# Patient Record
Sex: MALE | Race: WHITE | NOT HISPANIC OR LATINO | Employment: FULL TIME | URBAN - METROPOLITAN AREA
[De-identification: names, ages, dates, MRNs, and addresses within clinical notes are randomized per-mention and may not be internally consistent; named-entity substitution may affect disease eponyms.]

---

## 2017-02-17 ENCOUNTER — TELEPHONE (OUTPATIENT)
Dept: UROLOGY | Facility: CLINIC | Age: 47
End: 2017-02-17

## 2017-02-17 NOTE — TELEPHONE ENCOUNTER
Patient was calling to check the dosage of his testosterone injections.  When asked to review the bottle for dosage, he says there are no dosage numbers on the bottle or the box.    Advised that Dr. Davey prescribed 200mg/ml every 4 weeks.

## 2017-02-17 NOTE — TELEPHONE ENCOUNTER
----- Message from Verna Trevino sent at 2/17/2017  1:46 PM CST -----  Patient requesting to speak with nurse for milligram of injection/please call back at [phone] to advise.

## 2020-09-28 RX ORDER — AMLODIPINE BESYLATE 5 MG/1
5 TABLET ORAL DAILY
Qty: 90 TABLET | Refills: 0 | Status: SHIPPED | OUTPATIENT
Start: 2020-09-28 | End: 2022-01-24

## 2020-09-28 RX ORDER — LOSARTAN POTASSIUM 25 MG/1
25 TABLET ORAL DAILY
Qty: 90 TABLET | Refills: 0 | Status: SHIPPED | OUTPATIENT
Start: 2020-09-28 | End: 2021-10-08 | Stop reason: SDUPTHER

## 2020-09-28 RX ORDER — METOPROLOL SUCCINATE 25 MG/1
25 TABLET, EXTENDED RELEASE ORAL DAILY
Qty: 90 TABLET | Refills: 0 | Status: SHIPPED | OUTPATIENT
Start: 2020-09-28 | End: 2021-10-08 | Stop reason: SDUPTHER

## 2020-09-28 NOTE — TELEPHONE ENCOUNTER
----- Message from Florin Salazar sent at 9/28/2020  1:22 PM CDT -----  Regarding: refill  Pt said [facility] sent a request for refills you denied because he needed to make an appt   Made an appt in November in [place]     [phone]  [phone]

## 2021-05-06 ENCOUNTER — PATIENT MESSAGE (OUTPATIENT)
Dept: RESEARCH | Facility: HOSPITAL | Age: 51
End: 2021-05-06

## 2021-10-07 ENCOUNTER — TELEPHONE (OUTPATIENT)
Dept: CARDIOLOGY | Facility: CLINIC | Age: 51
End: 2021-10-07

## 2021-10-08 RX ORDER — LOSARTAN POTASSIUM 25 MG/1
25 TABLET ORAL DAILY
Qty: 90 TABLET | Refills: 3 | Status: SHIPPED | OUTPATIENT
Start: 2021-10-08 | End: 2022-01-24

## 2021-10-08 RX ORDER — METOPROLOL SUCCINATE 25 MG/1
25 TABLET, EXTENDED RELEASE ORAL DAILY
Qty: 90 TABLET | Refills: 3 | Status: SHIPPED | OUTPATIENT
Start: 2021-10-08 | End: 2022-02-07 | Stop reason: SDUPTHER

## 2021-11-24 ENCOUNTER — OFFICE VISIT (OUTPATIENT)
Dept: ORTHOPEDICS | Facility: CLINIC | Age: 51
End: 2021-11-24
Payer: COMMERCIAL

## 2021-11-24 VITALS — HEIGHT: 70 IN | WEIGHT: 231 LBS | BODY MASS INDEX: 33.07 KG/M2

## 2021-11-24 DIAGNOSIS — S46.211A RUPTURE OF RIGHT BICEPS TENDON, INITIAL ENCOUNTER: Primary | ICD-10-CM

## 2021-11-24 PROCEDURE — 4010F ACE/ARB THERAPY RXD/TAKEN: CPT | Mod: S$GLB,,, | Performed by: PHYSICIAN ASSISTANT

## 2021-11-24 PROCEDURE — 99203 PR OFFICE/OUTPT VISIT, NEW, LEVL III, 30-44 MIN: ICD-10-PCS | Mod: S$GLB,,, | Performed by: PHYSICIAN ASSISTANT

## 2021-11-24 PROCEDURE — 4010F PR ACE/ARB THEARPY RXD/TAKEN: ICD-10-PCS | Mod: S$GLB,,, | Performed by: PHYSICIAN ASSISTANT

## 2021-11-24 PROCEDURE — 99203 OFFICE O/P NEW LOW 30 MIN: CPT | Mod: S$GLB,,, | Performed by: PHYSICIAN ASSISTANT

## 2021-11-24 RX ORDER — CELECOXIB 100 MG/1
CAPSULE ORAL
COMMUNITY
Start: 2021-10-30 | End: 2022-07-05

## 2021-11-24 RX ORDER — CYCLOBENZAPRINE HCL 10 MG
TABLET ORAL
COMMUNITY
Start: 2021-11-16 | End: 2022-07-05

## 2021-12-10 ENCOUNTER — OFFICE VISIT (OUTPATIENT)
Dept: ORTHOPEDICS | Facility: CLINIC | Age: 51
End: 2021-12-10
Payer: COMMERCIAL

## 2021-12-10 VITALS — WEIGHT: 231 LBS | BODY MASS INDEX: 33.07 KG/M2 | HEIGHT: 70 IN

## 2021-12-10 DIAGNOSIS — M53.3 COCCYDYNIA: Primary | ICD-10-CM

## 2021-12-10 PROCEDURE — 20552 PR INJECT TRIGGER POINT, 1 OR 2: ICD-10-PCS | Mod: S$GLB,,, | Performed by: PHYSICIAN ASSISTANT

## 2021-12-10 PROCEDURE — 99213 PR OFFICE/OUTPT VISIT, EST, LEVL III, 20-29 MIN: ICD-10-PCS | Mod: 25,S$GLB,, | Performed by: PHYSICIAN ASSISTANT

## 2021-12-10 PROCEDURE — 99213 OFFICE O/P EST LOW 20 MIN: CPT | Mod: 25,S$GLB,, | Performed by: PHYSICIAN ASSISTANT

## 2021-12-10 PROCEDURE — 20552 NJX 1/MLT TRIGGER POINT 1/2: CPT | Mod: S$GLB,,, | Performed by: PHYSICIAN ASSISTANT

## 2021-12-10 PROCEDURE — 4010F PR ACE/ARB THEARPY RXD/TAKEN: ICD-10-PCS | Mod: S$GLB,,, | Performed by: PHYSICIAN ASSISTANT

## 2021-12-10 PROCEDURE — 4010F ACE/ARB THERAPY RXD/TAKEN: CPT | Mod: S$GLB,,, | Performed by: PHYSICIAN ASSISTANT

## 2021-12-10 RX ORDER — TRIAMCINOLONE ACETONIDE 40 MG/ML
40 INJECTION, SUSPENSION INTRA-ARTICULAR; INTRAMUSCULAR
Status: DISCONTINUED | OUTPATIENT
Start: 2021-12-10 | End: 2021-12-10 | Stop reason: HOSPADM

## 2021-12-10 RX ADMIN — TRIAMCINOLONE ACETONIDE 40 MG: 40 INJECTION, SUSPENSION INTRA-ARTICULAR; INTRAMUSCULAR at 09:12

## 2022-01-03 ENCOUNTER — OFFICE VISIT (OUTPATIENT)
Dept: UROLOGY | Facility: CLINIC | Age: 52
End: 2022-01-03
Payer: COMMERCIAL

## 2022-01-03 VITALS
BODY MASS INDEX: 33.08 KG/M2 | SYSTOLIC BLOOD PRESSURE: 147 MMHG | HEIGHT: 70 IN | HEART RATE: 72 BPM | WEIGHT: 231.06 LBS | DIASTOLIC BLOOD PRESSURE: 86 MMHG

## 2022-01-03 DIAGNOSIS — Z80.42 FAMILY HISTORY OF PROSTATE CANCER IN FATHER: Primary | ICD-10-CM

## 2022-01-03 DIAGNOSIS — R53.83 FATIGUE, UNSPECIFIED TYPE: ICD-10-CM

## 2022-01-03 LAB
BILIRUB SERPL-MCNC: NORMAL MG/DL
BLOOD URINE, POC: NORMAL
CLARITY, POC UA: CLEAR
COLOR, POC UA: YELLOW
GLUCOSE UR QL STRIP: NORMAL
KETONES UR QL STRIP: NORMAL
LEUKOCYTE ESTERASE URINE, POC: NORMAL
NITRITE, POC UA: NORMAL
PH, POC UA: 5
PROTEIN, POC: NORMAL
SPECIFIC GRAVITY, POC UA: 1.01
UROBILINOGEN, POC UA: NORMAL

## 2022-01-03 PROCEDURE — 1159F MED LIST DOCD IN RCRD: CPT | Mod: CPTII,S$GLB,, | Performed by: UROLOGY

## 2022-01-03 PROCEDURE — 3008F PR BODY MASS INDEX (BMI) DOCUMENTED: ICD-10-PCS | Mod: CPTII,S$GLB,, | Performed by: UROLOGY

## 2022-01-03 PROCEDURE — 3077F SYST BP >= 140 MM HG: CPT | Mod: CPTII,S$GLB,, | Performed by: UROLOGY

## 2022-01-03 PROCEDURE — 3008F BODY MASS INDEX DOCD: CPT | Mod: CPTII,S$GLB,, | Performed by: UROLOGY

## 2022-01-03 PROCEDURE — 3079F PR MOST RECENT DIASTOLIC BLOOD PRESSURE 80-89 MM HG: ICD-10-PCS | Mod: CPTII,S$GLB,, | Performed by: UROLOGY

## 2022-01-03 PROCEDURE — 99999 PR PBB SHADOW E&M-EST. PATIENT-LVL IV: ICD-10-PCS | Mod: PBBFAC,,, | Performed by: UROLOGY

## 2022-01-03 PROCEDURE — 1160F PR REVIEW ALL MEDS BY PRESCRIBER/CLIN PHARMACIST DOCUMENTED: ICD-10-PCS | Mod: CPTII,S$GLB,, | Performed by: UROLOGY

## 2022-01-03 PROCEDURE — 1160F RVW MEDS BY RX/DR IN RCRD: CPT | Mod: CPTII,S$GLB,, | Performed by: UROLOGY

## 2022-01-03 PROCEDURE — 81002 URINALYSIS NONAUTO W/O SCOPE: CPT | Mod: S$GLB,,, | Performed by: UROLOGY

## 2022-01-03 PROCEDURE — 3077F PR MOST RECENT SYSTOLIC BLOOD PRESSURE >= 140 MM HG: ICD-10-PCS | Mod: CPTII,S$GLB,, | Performed by: UROLOGY

## 2022-01-03 PROCEDURE — 3079F DIAST BP 80-89 MM HG: CPT | Mod: CPTII,S$GLB,, | Performed by: UROLOGY

## 2022-01-03 PROCEDURE — 1159F PR MEDICATION LIST DOCUMENTED IN MEDICAL RECORD: ICD-10-PCS | Mod: CPTII,S$GLB,, | Performed by: UROLOGY

## 2022-01-03 PROCEDURE — 81002 POCT URINE DIPSTICK WITHOUT MICROSCOPE: ICD-10-PCS | Mod: S$GLB,,, | Performed by: UROLOGY

## 2022-01-03 PROCEDURE — 99999 PR PBB SHADOW E&M-EST. PATIENT-LVL IV: CPT | Mod: PBBFAC,,, | Performed by: UROLOGY

## 2022-01-03 PROCEDURE — 99204 PR OFFICE/OUTPT VISIT, NEW, LEVL IV, 45-59 MIN: ICD-10-PCS | Mod: S$GLB,,, | Performed by: UROLOGY

## 2022-01-03 PROCEDURE — 99204 OFFICE O/P NEW MOD 45 MIN: CPT | Mod: S$GLB,,, | Performed by: UROLOGY

## 2022-01-03 NOTE — PATIENT INSTRUCTIONS
Nancy Retana is a 51 y.o. male with     Previously on T and fatigue improved no other sx.  Father had prostate cancer in his 50s/60s. Very low psa previously. Firm right prostate without nodules. Repeat screening psa.     If psa higher (doubled) may need further testing (mri?)  If psa about there same, continue to monitor with repeat REJI in 6months or sooner if we decide to replenish T.     If T<300, repeat  If T <300 x 2 then return for T discussion (treatment) and monitor psa and REJI closely     1. Family history of prostate cancer in father    2. Fatigue, unspecified type          Plan:     Testosterone before 9am Wednesday +  psa screening and cbc with auto dif. If h/h lower refer back to cards to eval fatigue. (no colonoscopy yet).     [place] live in [place]    At minimum f/u in 6 months for repeat REJI.

## 2022-01-03 NOTE — PROGRESS NOTES
[facility] - [place]  Staff: MD Petar  PCP: Primary Doctor No  Date of Service: 01/03/2022      Subjective:        HPI: Nancy Retana is a 51 y.o. male     They live in [place]    He saw me in 2016 for low T and family hx of prostate cancer.     His father had prostate cancer in 50s or early 60s. He was treated with surgery. No recurrence. Pt had a psa in 2010 0.35 and 2016 0.61. none checked since and no problems urinating.    We also had a checked a T. T was 340 and he did T injections for about a year. He felt like it helped with fatigue and feels like he wants to try again. He never had a f/u after and hasn't been seen since. He can't remember stopping.    The patient does have good libido   The patient does not have erectile dysfunction  The patient does not have depression  The patient does have fatigue  The patient does not have concentration issues  The patient does not have difficulty gaining or maintaining muscle mass    The patient gets 5-6 sleep a night.   The patient does not workout regularly  The patient does have obstructive sleep apnea? Snores, stops breathing.     Sees  for ablation history, htn       Testosterone labs:  11/4/16: T:340, H/H: 14.9/43.3, PSA: 0.61      Current REVIEW OF SYSTEMS:  Neg except for as stated above    Urine history:   1/3/22 neg    PSA History:  malignancies: Yes - prostate cancer dx age late 50s treaed with prostatectomy  .   11/4/16 0.61, REJI: 30g  3/29/10 0.35      REVIEW OF SYSTEMS:  Negative except for as stated above    Past Medical History:   Diagnosis Date    Hypertension        Past Surgical History:   Procedure Laterality Date    CARDIAC ELECTROPHYSIOLOGY STUDY AND ABLATION      TONSILLECTOMY, ADENOIDECTOMY      VASECTOMY             Objective:     Vitals:    01/03/22 1026   BP: (!) 147/86   Pulse: 72       Focused  exam  Inspection of anus normal  No scrotal rashes, cysts or lesions  Epididymis  normal in size, no tenderness  Testes normal and size, equal size bilaterally, no masses  Urethral meatus normal without discharge  Penis is circumcised  REJI: 25g gland without masses but firm on right, tenderness. SV not palpable. Normal sphincter tone.   No bilateral inguinal hernias noted       Assessment:     Nancy Retana is a 51 y.o. male with     Previously on T and fatigue improved no other sx.  Father had prostate cancer in his 50s/60s. Very low psa previously. Firm right prostate without nodules. Repeat screening psa.     If psa higher (doubled) may need further testing (mri?)  If psa about there same, continue to monitor with repeat REJI in 6months or sooner if we decide to replenish T.     If T<300, repeat  If T <300 x 2 then return for T discussion (treatment) and monitor psa and REJI closely     1. Family history of prostate cancer in father    2. Fatigue, unspecified type          Plan:     Testosterone before 9am Wednesday +  psa screening and cbc with auto dif. If h/h lower refer back to cards to eval fatigue. (no colonoscopy yet).     [place] in [place]    At minimum f/u in 6 months for repeat REJI. -scheduled today     Opal Davey MD

## 2022-01-05 ENCOUNTER — TELEPHONE (OUTPATIENT)
Dept: UROLOGY | Facility: CLINIC | Age: 52
End: 2022-01-05
Payer: COMMERCIAL

## 2022-01-05 NOTE — TELEPHONE ENCOUNTER
----- Message from Cierra Machado sent at 1/5/2022  8:34 AM CST -----  Regarding: lab order request  Contact: [facility]  Caller: [facility]  Phone: [phone]  Fax# [phone]  Nature of call: caller requesting lab orders; testosterone and PSA.   Patient is there waiting.  Please call upon request.  Thank you!

## 2022-01-06 ENCOUNTER — TELEPHONE (OUTPATIENT)
Dept: UROLOGY | Facility: CLINIC | Age: 52
End: 2022-01-06
Payer: COMMERCIAL

## 2022-01-06 NOTE — TELEPHONE ENCOUNTER
Returned call and spoke with patient, inquired where did he have his lab work done because nothing is resulted in the chart. He states at [facility], looked thru faxed records, informed nothing received, will contact them to get faxed to office for MD to review, patient verbally understood.

## 2022-01-06 NOTE — TELEPHONE ENCOUNTER
----- Message from Hollie Lee sent at 1/6/2022  1:01 PM CST -----  Regarding: Test Results  Contact: Patient/[phone]  Type:  Test Results    Who Called:  Patient/[phone] (home) [phone] (work)    Name of Test (Lab/Mammo/Etc):  Lab  Date of Test:  1/5/22  Ordering Provider:  same  Where the test was performed:  [facility]    Additional Information:  States the results were faxed to your office yesterday. Please call to advise.

## 2022-01-06 NOTE — TELEPHONE ENCOUNTER
----- Message from Jeannette Oconnell sent at 1/6/2022 10:44 AM CST -----  Contact: self  Type:  Test Results    Who Called:  patient  Name of Test (Lab/Mammo/Etc):  labs  Date of Test:  1/5  Ordering Provider:  Dr Davey  Where the test was performed:  [facility]  Best Call Back Number:  [phone] (work)  Additional Information:  thanks

## 2022-01-11 NOTE — TELEPHONE ENCOUNTER
12/30/21  psa 0.6, h/h 16.0/48.8    I did not see a testosterone lab  Please call and see if they stacey a testosterone lab  Verbally obtain the testosterone value and document

## 2022-01-12 NOTE — TELEPHONE ENCOUNTER
Spoke with patient and informed him of results and recommendations to repeat his T. Patient verbalized understanding.

## 2022-01-12 NOTE — TELEPHONE ENCOUNTER
Let him know his T level was 174. This is less than 300. Therefore it needs to be repeated again and needs to be done before 9am.   If its <300 twice he can return to discuss T treatmen t.     12/30/21           psa 0.6, h/h 16.0/48.8, T 174

## 2022-01-24 ENCOUNTER — OFFICE VISIT (OUTPATIENT)
Dept: CARDIOLOGY | Facility: CLINIC | Age: 52
End: 2022-01-24
Payer: COMMERCIAL

## 2022-01-24 VITALS
DIASTOLIC BLOOD PRESSURE: 100 MMHG | SYSTOLIC BLOOD PRESSURE: 130 MMHG | BODY MASS INDEX: 34.36 KG/M2 | WEIGHT: 240 LBS | HEIGHT: 70 IN | HEART RATE: 80 BPM

## 2022-01-24 DIAGNOSIS — I10 ESSENTIAL HYPERTENSION: ICD-10-CM

## 2022-01-24 DIAGNOSIS — E78.5 DYSLIPIDEMIA: ICD-10-CM

## 2022-01-24 DIAGNOSIS — I10 HYPERTENSION, UNSPECIFIED TYPE: Primary | ICD-10-CM

## 2022-01-24 DIAGNOSIS — E66.01 MORBID OBESITY: ICD-10-CM

## 2022-01-24 PROCEDURE — 1160F PR REVIEW ALL MEDS BY PRESCRIBER/CLIN PHARMACIST DOCUMENTED: ICD-10-PCS | Mod: CPTII,S$GLB,, | Performed by: INTERNAL MEDICINE

## 2022-01-24 PROCEDURE — 93000 EKG 12-LEAD: ICD-10-PCS | Mod: S$GLB,,, | Performed by: INTERNAL MEDICINE

## 2022-01-24 PROCEDURE — 93000 ELECTROCARDIOGRAM COMPLETE: CPT | Mod: S$GLB,,, | Performed by: INTERNAL MEDICINE

## 2022-01-24 PROCEDURE — 3080F DIAST BP >= 90 MM HG: CPT | Mod: CPTII,S$GLB,, | Performed by: INTERNAL MEDICINE

## 2022-01-24 PROCEDURE — 3080F PR MOST RECENT DIASTOLIC BLOOD PRESSURE >= 90 MM HG: ICD-10-PCS | Mod: CPTII,S$GLB,, | Performed by: INTERNAL MEDICINE

## 2022-01-24 PROCEDURE — 1160F RVW MEDS BY RX/DR IN RCRD: CPT | Mod: CPTII,S$GLB,, | Performed by: INTERNAL MEDICINE

## 2022-01-24 PROCEDURE — 3008F PR BODY MASS INDEX (BMI) DOCUMENTED: ICD-10-PCS | Mod: CPTII,S$GLB,, | Performed by: INTERNAL MEDICINE

## 2022-01-24 PROCEDURE — 1159F MED LIST DOCD IN RCRD: CPT | Mod: CPTII,S$GLB,, | Performed by: INTERNAL MEDICINE

## 2022-01-24 PROCEDURE — 3075F PR MOST RECENT SYSTOLIC BLOOD PRESS GE 130-139MM HG: ICD-10-PCS | Mod: CPTII,S$GLB,, | Performed by: INTERNAL MEDICINE

## 2022-01-24 PROCEDURE — 99214 OFFICE O/P EST MOD 30 MIN: CPT | Mod: S$GLB,,, | Performed by: INTERNAL MEDICINE

## 2022-01-24 PROCEDURE — 3008F BODY MASS INDEX DOCD: CPT | Mod: CPTII,S$GLB,, | Performed by: INTERNAL MEDICINE

## 2022-01-24 PROCEDURE — 1159F PR MEDICATION LIST DOCUMENTED IN MEDICAL RECORD: ICD-10-PCS | Mod: CPTII,S$GLB,, | Performed by: INTERNAL MEDICINE

## 2022-01-24 PROCEDURE — 99214 PR OFFICE/OUTPT VISIT, EST, LEVL IV, 30-39 MIN: ICD-10-PCS | Mod: S$GLB,,, | Performed by: INTERNAL MEDICINE

## 2022-01-24 PROCEDURE — 3075F SYST BP GE 130 - 139MM HG: CPT | Mod: CPTII,S$GLB,, | Performed by: INTERNAL MEDICINE

## 2022-01-24 RX ORDER — OLMESARTAN MEDOXOMIL AND HYDROCHLOROTHIAZIDE 20/12.5 20; 12.5 MG/1; MG/1
1 TABLET ORAL DAILY
Qty: 90 TABLET | Refills: 3 | Status: ON HOLD | OUTPATIENT
Start: 2022-01-24 | End: 2023-01-25

## 2022-01-24 RX ORDER — POTASSIUM CHLORIDE 750 MG/1
20 TABLET, EXTENDED RELEASE ORAL 2 TIMES DAILY
Qty: 360 TABLET | Refills: 3 | Status: SHIPPED | OUTPATIENT
Start: 2022-01-24 | End: 2022-09-09 | Stop reason: SDUPTHER

## 2022-01-24 NOTE — ASSESSMENT & PLAN NOTE
His BMI is 30 in 4.44 are.  He is going to get back on restricted diet and also initiate an exercise program are.  Previously he has lost over 60 lb with careful dietary intake and encouraged him to get back on it.  His EKG today shows sinus rhythm incomplete right bundle branch and moderate criteria for LVH is present

## 2022-01-24 NOTE — ASSESSMENT & PLAN NOTE
His blood pressure remains elevated.  His home recording of also shown elevated blood pressures recommend to do the following  1. Stop losartan  2. Start him on Benicar head CT at 20/12.5 mg daily and add 10 mg of potassium to his regimen.  3. Continue Toprol-XL 25 mg daily in the morning

## 2022-01-24 NOTE — ASSESSMENT & PLAN NOTE
Out encourage him to recheck his labs including liver profile and lipid levels.  Maintain low-fat low-cholesterol diet

## 2022-01-24 NOTE — PROGRESS NOTES
Subjective:    Patient ID:  Nancy Retana is a 51 y.o. male patient here for evaluation No chief complaint on file.      History of Present Illness:     Patient is a 51-year-old gentleman is here for follow-up evaluation arm he had prior ablated therapy done.  He did not feel good and ended up going to the emergency room and early part of January apparently preliminary evaluation was negative and other than high blood pressure he was subsequently discharged home.  Are since his be home has been doing fairly well denies having chest discomfort no jaw pain no shoulder pain.  No nausea vomiting no blood in the stools or black stools  Blood pressure at home recordings have also been elevated.      Review of patient's allergies indicates:  No Known Allergies    Past Medical History:   Diagnosis Date    Hypertension      Past Surgical History:   Procedure Laterality Date    CARDIAC ELECTROPHYSIOLOGY STUDY AND ABLATION      TONSILLECTOMY, ADENOIDECTOMY      VASECTOMY       Social History     Tobacco Use    Smoking status: Never Smoker    Smokeless tobacco: Never Used        Review of Systems:    As noted in HPI in addition      REVIEW OF SYSTEMS  CARDIOVASCULAR: No recent chest pain, palpitations, arm, neck, or jaw pain  RESPIRATORY: No recent fever, cough chills, SOB or congestion  : No blood in the urine  GI: No Nausea, vomiting, constipation, diarrhea, blood, or reflux.  MUSCULOSKELETAL: No myalgias  NEURO: No lightheadedness or dizziness  EYES: No Double vision, blurry, vision or headache              Objective        Vitals:    01/24/22 1605   BP: (!) 130/100   Pulse: 80       LIPIDS - LAST 2   Lab Results   Component Value Date    CHOL 225 (H) 11/17/2016    HDL 46 11/17/2016    LDLCALC 135.4 11/17/2016    TRIG 218 (H) 11/17/2016    CHOLHDL 20.4 11/17/2016       CBC - LAST 2  Lab Results   Component Value Date    WBC 12.50 11/17/2016    RBC 4.61 11/17/2016    HGB 14.1 11/17/2016    HCT 41.7 11/17/2016     MCV 91 11/17/2016    MCH 30.7 11/17/2016    MCHC 33.9 11/17/2016    RDW 12.2 11/17/2016     11/17/2016    MPV 7.5 (L) 11/17/2016       CHEMISTRY & LIVER FUNCTION - LAST 2  Lab Results   Component Value Date     11/17/2016    K 4.2 11/17/2016     11/17/2016    CO2 25 11/17/2016    ANIONGAP 10 11/17/2016    BUN 17 11/17/2016    CREATININE 1.0 11/17/2016     (H) 11/17/2016    CALCIUM 9.5 11/17/2016    ALBUMIN 3.7 11/17/2016    ALBUMIN 4.7 11/04/2016    PROT 7.8 11/17/2016    ALKPHOS 67 11/17/2016    ALT 30 11/17/2016    AST 17 11/17/2016    BILITOT 0.6 11/17/2016        CARDIAC PROFILE - LAST 2  No results found for: BNP, CPK, CPKMB, LDH, TROPONINI     COAGULATION - LAST 2  No results found for: LABPT, INR, APTT    ENDOCRINE & PSA - LAST 2  Lab Results   Component Value Date    PSA 0.35 03/29/2010        ECHOCARDIOGRAM RESULTS  No results found for this or any previous visit.      CURRENT/PREVIOUS VISIT EKG  No results found for this or any previous visit.  No valid procedures specified.   No results found for this or any previous visit.    No valid procedures specified.    PHYSICAL EXAM  CONSTITUTIONAL: Well built, well nourished in no apparent distress  NECK: no carotid bruit, no JVD  LUNGS: CTA  CHEST WALL: no tenderness  HEART: regular rate and rhythm, S1, S2 normal, no murmur, click, rub or gallop   ABDOMEN: soft, non-tender; bowel sounds normal; no masses,  no organomegaly  EXTREMITIES: Extremities normal, no edema, no calf tenderness noted  NEURO: AAO X 3    I HAVE REVIEWED :    The vital signs, nurses notes, and all the pertinent radiology and labs.        Current Outpatient Medications   Medication Instructions    celecoxib (CELEBREX) 100 MG capsule No dose, route, or frequency recorded.    cyclobenzaprine (FLEXERIL) 10 MG tablet No dose, route, or frequency recorded.    DOCOSAHEXANOIC ACID/EPA (FISH OIL ORAL) Oral    FOLIC ACID ORAL Oral    GREEN TEA LEAF EXTRACT (GREEN TEA  ORAL) Oral    KRILL OIL ORAL Oral    metoprolol succinate (TOPROL-XL) 25 mg, Oral, Daily    multivitamin capsule 1 capsule, Oral, Daily    NAPROXEN SODIUM (ALEVE ORAL) Oral    olmesartan-hydrochlorothiazide (BENICAR HCT) 20-12.5 mg per tablet 1 tablet, Oral, Daily    potassium chloride SA (K-DUR,KLOR-CON) 10 MEQ tablet 20 mEq, Oral, 2 times daily    RED YEAST RICE ORAL Oral    TURMERIC, BULK, MISC Misc.(Non-Drug; Combo Route)    UBIDECARENONE (CO Q-10 ORAL) Oral      01/24/2022 EKG shows sinus rhythm incomplete right bundle branch morphology voltage criteria for LVH is present with no acute ST changes.    Assessment & Plan     Essential hypertension  His blood pressure remains elevated.  His home recording of also shown elevated blood pressures recommend to do the following  1. Stop losartan  2. Start him on Benicar head CT at 20/12.5 mg daily and add 10 mg of potassium to his regimen.  3. Continue Toprol-XL 25 mg daily in the morning  4. Continue on metoprolol succinate 25 mg daily     Dyslipidemia  Out encourage him to recheck his labs including liver profile and lipid levels.  Maintain low-fat low-cholesterol diet    Morbid obesity  His BMI is 30 in 4.44 are.  He is going to get back on restricted diet and also initiate an exercise program are.  Previously he has lost over 60 lb with careful dietary intake and encouraged him to get back on it.  His EKG today shows sinus rhythm incomplete right bundle branch and moderate criteria for LVH is present          Follow up in about 3 months (around 4/24/2022).

## 2022-02-02 ENCOUNTER — PATIENT MESSAGE (OUTPATIENT)
Dept: GASTROENTEROLOGY | Facility: CLINIC | Age: 52
End: 2022-02-02
Payer: COMMERCIAL

## 2022-02-07 RX ORDER — METOPROLOL SUCCINATE 25 MG/1
25 TABLET, EXTENDED RELEASE ORAL DAILY
Qty: 90 TABLET | Refills: 3 | Status: SHIPPED | OUTPATIENT
Start: 2022-02-07

## 2022-02-07 NOTE — TELEPHONE ENCOUNTER
----- Message from Reggie Giraldo sent at 2/7/2022  8:29 AM CST -----  Contact: Patient  The pt called and would like to know which medication he is supposed to stop taking     Please call him back at [phone]

## 2022-04-04 ENCOUNTER — PATIENT MESSAGE (OUTPATIENT)
Dept: GASTROENTEROLOGY | Facility: CLINIC | Age: 52
End: 2022-04-04
Payer: COMMERCIAL

## 2022-07-05 ENCOUNTER — TELEPHONE (OUTPATIENT)
Dept: FAMILY MEDICINE | Facility: CLINIC | Age: 52
End: 2022-07-05
Payer: COMMERCIAL

## 2022-07-05 ENCOUNTER — PATIENT MESSAGE (OUTPATIENT)
Dept: UROLOGY | Facility: CLINIC | Age: 52
End: 2022-07-05

## 2022-07-05 ENCOUNTER — OFFICE VISIT (OUTPATIENT)
Dept: UROLOGY | Facility: CLINIC | Age: 52
End: 2022-07-05
Payer: COMMERCIAL

## 2022-07-05 VITALS
RESPIRATION RATE: 18 BRPM | WEIGHT: 245 LBS | SYSTOLIC BLOOD PRESSURE: 126 MMHG | DIASTOLIC BLOOD PRESSURE: 83 MMHG | BODY MASS INDEX: 35.07 KG/M2 | HEART RATE: 80 BPM | HEIGHT: 70 IN

## 2022-07-05 DIAGNOSIS — R53.83 FATIGUE, UNSPECIFIED TYPE: Primary | ICD-10-CM

## 2022-07-05 DIAGNOSIS — E29.1 HYPOGONADISM IN MALE: ICD-10-CM

## 2022-07-05 PROCEDURE — 3074F PR MOST RECENT SYSTOLIC BLOOD PRESSURE < 130 MM HG: ICD-10-PCS | Mod: CPTII,S$GLB,, | Performed by: UROLOGY

## 2022-07-05 PROCEDURE — 1159F MED LIST DOCD IN RCRD: CPT | Mod: CPTII,S$GLB,, | Performed by: UROLOGY

## 2022-07-05 PROCEDURE — 3008F PR BODY MASS INDEX (BMI) DOCUMENTED: ICD-10-PCS | Mod: CPTII,S$GLB,, | Performed by: UROLOGY

## 2022-07-05 PROCEDURE — 3079F DIAST BP 80-89 MM HG: CPT | Mod: CPTII,S$GLB,, | Performed by: UROLOGY

## 2022-07-05 PROCEDURE — 3008F BODY MASS INDEX DOCD: CPT | Mod: CPTII,S$GLB,, | Performed by: UROLOGY

## 2022-07-05 PROCEDURE — 99999 PR PBB SHADOW E&M-EST. PATIENT-LVL IV: CPT | Mod: PBBFAC,,, | Performed by: UROLOGY

## 2022-07-05 PROCEDURE — 3074F SYST BP LT 130 MM HG: CPT | Mod: CPTII,S$GLB,, | Performed by: UROLOGY

## 2022-07-05 PROCEDURE — 99999 PR PBB SHADOW E&M-EST. PATIENT-LVL IV: ICD-10-PCS | Mod: PBBFAC,,, | Performed by: UROLOGY

## 2022-07-05 PROCEDURE — 99215 OFFICE O/P EST HI 40 MIN: CPT | Mod: S$GLB,,, | Performed by: UROLOGY

## 2022-07-05 PROCEDURE — 1159F PR MEDICATION LIST DOCUMENTED IN MEDICAL RECORD: ICD-10-PCS | Mod: CPTII,S$GLB,, | Performed by: UROLOGY

## 2022-07-05 PROCEDURE — 1160F PR REVIEW ALL MEDS BY PRESCRIBER/CLIN PHARMACIST DOCUMENTED: ICD-10-PCS | Mod: CPTII,S$GLB,, | Performed by: UROLOGY

## 2022-07-05 PROCEDURE — 4010F PR ACE/ARB THEARPY RXD/TAKEN: ICD-10-PCS | Mod: CPTII,S$GLB,, | Performed by: UROLOGY

## 2022-07-05 PROCEDURE — 3079F PR MOST RECENT DIASTOLIC BLOOD PRESSURE 80-89 MM HG: ICD-10-PCS | Mod: CPTII,S$GLB,, | Performed by: UROLOGY

## 2022-07-05 PROCEDURE — 1160F RVW MEDS BY RX/DR IN RCRD: CPT | Mod: CPTII,S$GLB,, | Performed by: UROLOGY

## 2022-07-05 PROCEDURE — 4010F ACE/ARB THERAPY RXD/TAKEN: CPT | Mod: CPTII,S$GLB,, | Performed by: UROLOGY

## 2022-07-05 PROCEDURE — 99215 PR OFFICE/OUTPT VISIT, EST, LEVL V, 40-54 MIN: ICD-10-PCS | Mod: S$GLB,,, | Performed by: UROLOGY

## 2022-07-05 RX ORDER — LOSARTAN POTASSIUM 50 MG/1
50 TABLET ORAL
COMMUNITY
End: 2023-01-24 | Stop reason: SDUPTHER

## 2022-07-05 RX ORDER — METOPROLOL TARTRATE 25 MG/1
25 TABLET, FILM COATED ORAL
COMMUNITY
End: 2023-01-24

## 2022-07-05 NOTE — PATIENT INSTRUCTIONS
Nancy Retana is a 51 y.o. male     With fatigue and hypogonadism workup underway. T in January was 174. Need to repeat and if low restart     Will stay with me bc of h/o of father with prostate cancer. Will need to monitor psa and REJI     1. Fatigue, unspecified type    2. Hypogonadism in male        Plan:     Cbc, T and psa soon before 9am soon    If T <300 before 9am will start T treatment. Doesn't have to see me to start.  If labs done and no f/u or instructions given, write me on epic.     Discussed cream vs injections . Proceed with injections  Probably T200 q2 weeks (could always do 100q 1week)- 1st injection here to learn. 10mL vials (200mg per 1mL, 9 doses in a vial).     Cbc T and psa in a month (5-7 days after administration)  Cbc T and psa in 4 month (1-2 days prior to shot) - REJI then.     As long as psa doesn't rise or h/h doesn't raise and CHRISTOPHER? Type sx don't worsen - then can change to aveed in 4 months.     Sent message to pcp to establish care. Sees julia lui for years for cards.  Pt will make f/u with GI for screening colonoscopy    Will stay with me bc of h/o of father with prostate cancer. Will need to monitor psa and REJI

## 2022-07-05 NOTE — TELEPHONE ENCOUNTER
----- Message from Opal Davey MD sent at 7/5/2022  2:42 PM CDT -----  This pt wasnts to establish pcp. Doesn't matter who , just whoever has first avail

## 2022-07-05 NOTE — PROGRESS NOTES
[facility] - [place]  Staff: MD Petar  PCP: Primary Doctor No  Date of Service: 07/05/2022      Subjective:        HPI: Nancy Retana is a 51 y.o. male     He saw me in 2016 for low T and family hx of prostate cancer.   · His father had prostate cancer in 50s or early 60s. He was treated with surgery. No recurrence. Pt had a psa in 2010 0.35 and 2016 0.61. none checked since and no problems urinating.  · We also had a checked a T. T was 340 and he did T injections for about a year. He felt like it helped with fatigue and feels like he wants to try again. He never had a f/u after and hasn't been seen since. He can't remember stopping.  · Only significant sx was fatigue.     Interval history since last visit with me:  Pt had a T, psa and h/h checked in January.   T was 174, psa was 0.6 and h/h 16.0/48.8.    HPI/CC today 7/5/22:   Fatigue still his main issue  Had T done but can't find labs.   No Ed. No problems urinating. Hasn't had a colonsocopy. Moved to [place]    Testosterone labs:  1/5/22 T 174,   h/h 16.0/48.8  12/30/21 psa 0.6  11/4/16: T:340, H/H: 14.9/43.3, PSA: 0.61    Urine history:   1/3/22 neg    PSA History: father prostate cancer dx age late 50s treaed with prostatectomy    1/3/22  REJI: 25g gland without masses but firm on right,   12/30/21 0.6   11/4/16 0.61, REJI: 30g  3/29/10 0.35      REVIEW OF SYSTEMS:  Negative except for as stated above      Objective:     Vitals:    07/05/22 1403   BP: 126/83   Pulse: 80   Resp: 18     gu exam as above     Assessment:     Nancy Retana is a 51 y.o. male     With fatigue and hypogonadism workup underway. T in January was 174. Need to repeat and if low restart     Will stay with me bc of h/o of father with prostate cancer. Will need to monitor psa and REJI     1. Fatigue, unspecified type    2. Hypogonadism in male        Plan:     Cbc, T and psa soon before 9am soon    If T <300 before 9am will start T treatment.  Doesn't have to see me to start.  If labs done and no f/u or instructions given, write me on epic.     Discussed cream vs injections . Proceed with injections  Probably T200 q2 weeks (could always do 100q 1week)- 1st injection here to learn. 10mL vials (200mg per 1mL, 9 doses in a vial).     Cbc T and psa in a month (5-7 days after administration)  Cbc T and psa in 4 month (1-2 days prior to shot) - REJI then.     As long as psa doesn't rise or h/h doesn't raise and CHRISTOPHER? Type sx don't worsen - then can change to aveed in 4 months.     Sent message to pcp to establish care. Sees julia already for years for cards.  Pt will make f/u with GI for screening colonoscopy    Will stay with me bc of h/o of father with prostate cancer. Will need to monitor psa and REJI     Opal Davey MD

## 2022-07-05 NOTE — TELEPHONE ENCOUNTER
New patient establish care appointment scheduled for the date of 8-16-22. Patient agreed to appointment date, time, and location. Appointment scheduled using auto search to populate appropriate date, time, and length of appointment.

## 2022-07-08 ENCOUNTER — OFFICE VISIT (OUTPATIENT)
Dept: ORTHOPEDICS | Facility: CLINIC | Age: 52
End: 2022-07-08
Payer: COMMERCIAL

## 2022-07-08 VITALS — WEIGHT: 225 LBS | HEIGHT: 70 IN | BODY MASS INDEX: 32.21 KG/M2

## 2022-07-08 DIAGNOSIS — M17.11 ARTHRITIS OF KNEE, RIGHT: ICD-10-CM

## 2022-07-08 DIAGNOSIS — M17.11 PATELLOFEMORAL ARTHRITIS OF RIGHT KNEE: Primary | ICD-10-CM

## 2022-07-08 PROCEDURE — 4010F ACE/ARB THERAPY RXD/TAKEN: CPT | Mod: CPTII,S$GLB,, | Performed by: PHYSICIAN ASSISTANT

## 2022-07-08 PROCEDURE — 20610 DRAIN/INJ JOINT/BURSA W/O US: CPT | Mod: RT,S$GLB,, | Performed by: PHYSICIAN ASSISTANT

## 2022-07-08 PROCEDURE — 1159F MED LIST DOCD IN RCRD: CPT | Mod: CPTII,S$GLB,, | Performed by: PHYSICIAN ASSISTANT

## 2022-07-08 PROCEDURE — 1160F RVW MEDS BY RX/DR IN RCRD: CPT | Mod: CPTII,S$GLB,, | Performed by: PHYSICIAN ASSISTANT

## 2022-07-08 PROCEDURE — 99213 OFFICE O/P EST LOW 20 MIN: CPT | Mod: 25,S$GLB,, | Performed by: PHYSICIAN ASSISTANT

## 2022-07-08 PROCEDURE — 4010F PR ACE/ARB THEARPY RXD/TAKEN: ICD-10-PCS | Mod: CPTII,S$GLB,, | Performed by: PHYSICIAN ASSISTANT

## 2022-07-08 PROCEDURE — 3008F PR BODY MASS INDEX (BMI) DOCUMENTED: ICD-10-PCS | Mod: CPTII,S$GLB,, | Performed by: PHYSICIAN ASSISTANT

## 2022-07-08 PROCEDURE — 20610 LARGE JOINT ASPIRATION/INJECTION: R KNEE: ICD-10-PCS | Mod: RT,S$GLB,, | Performed by: PHYSICIAN ASSISTANT

## 2022-07-08 PROCEDURE — 1160F PR REVIEW ALL MEDS BY PRESCRIBER/CLIN PHARMACIST DOCUMENTED: ICD-10-PCS | Mod: CPTII,S$GLB,, | Performed by: PHYSICIAN ASSISTANT

## 2022-07-08 PROCEDURE — 3008F BODY MASS INDEX DOCD: CPT | Mod: CPTII,S$GLB,, | Performed by: PHYSICIAN ASSISTANT

## 2022-07-08 PROCEDURE — 1159F PR MEDICATION LIST DOCUMENTED IN MEDICAL RECORD: ICD-10-PCS | Mod: CPTII,S$GLB,, | Performed by: PHYSICIAN ASSISTANT

## 2022-07-08 PROCEDURE — 99213 PR OFFICE/OUTPT VISIT, EST, LEVL III, 20-29 MIN: ICD-10-PCS | Mod: 25,S$GLB,, | Performed by: PHYSICIAN ASSISTANT

## 2022-07-08 RX ORDER — TRIAMCINOLONE ACETONIDE 40 MG/ML
40 INJECTION, SUSPENSION INTRA-ARTICULAR; INTRAMUSCULAR
Status: DISCONTINUED | OUTPATIENT
Start: 2022-07-08 | End: 2022-07-08 | Stop reason: HOSPADM

## 2022-07-08 RX ADMIN — TRIAMCINOLONE ACETONIDE 40 MG: 40 INJECTION, SUSPENSION INTRA-ARTICULAR; INTRAMUSCULAR at 10:07

## 2022-07-08 NOTE — PROGRESS NOTES
[facility]  [address]  [place] [address]  Phone: [phone]   [phone]    Patient's PCP: Primary Doctor No  Referring Provider: No ref. provider found    Subjective:      Chief Complaint:   Chief Complaint   Patient presents with    Right Knee - Pain     Right knee pain x 2 weeks, will radiate to lower leg at times and will radiate upwards at times, but the primarily at the knee, no swelling, no giving way, no popping       Past Medical History:   Diagnosis Date    Hypertension        Past Surgical History:   Procedure Laterality Date    CARDIAC ELECTROPHYSIOLOGY STUDY AND ABLATION      TONSILLECTOMY, ADENOIDECTOMY      VASECTOMY         Current Outpatient Medications   Medication Sig    aspirin 81 mg Cap Take 81 mg by mouth.    DOCOSAHEXANOIC ACID/EPA (FISH OIL ORAL) Take by mouth.    FOLIC ACID ORAL Take by mouth.    GREEN TEA LEAF EXTRACT (GREEN TEA ORAL) Take by mouth.    KRILL OIL ORAL Take by mouth.    losartan (COZAAR) 50 MG tablet Take 50 mg by mouth.    metoprolol succinate (TOPROL-XL) 25 MG 24 hr tablet Take 1 tablet (25 mg total) by mouth once daily.    metoprolol tartrate (LOPRESSOR) 25 MG tablet Take 25 mg by mouth.    multivitamin capsule Take 1 capsule by mouth once daily.    NAPROXEN SODIUM (ALEVE ORAL) Take by mouth.    olmesartan-hydrochlorothiazide (BENICAR HCT) 20-12.5 mg per tablet Take 1 tablet by mouth once daily.    potassium chloride SA (K-DUR,KLOR-CON) 10 MEQ tablet Take 2 tablets (20 mEq total) by mouth 2 (two) times daily.    RED YEAST RICE ORAL Take by mouth.    TURMERIC, BULK, MISC by Misc.(Non-Drug; Combo Route) route.    UBIDECARENONE (CO Q-10 ORAL) Take by mouth.     No current facility-administered medications for this visit.       Review of patient's allergies indicates:  No Known Allergies    History reviewed. No pertinent family history.    Social History     Socioeconomic History    Marital status:    Tobacco Use     Smoking status: Never Smoker    Smokeless tobacco: Never Used       History of present illness:  Mr. Cruz comes in today with a chief complaint of right knee pain that has been going on for approximately 2 weeks.  He denies any injury or trauma to the knee.  He reports he does not notice any swelling.  He has not tried any formal treatment for this.  He localizes the majority of his discomfort towards the medial and posterior aspect of the knee.    Review of Systems:    Constitutional: Negative for chills, fever and weight loss.   HENT: Negative for congestion.    Eyes: Negative for discharge and redness.   Respiratory: Negative for cough and shortness of breath.    Cardiovascular: Negative for chest pain.   Gastrointestinal: Negative for nausea and vomiting.   Musculoskeletal: See HPI.   Skin: Negative for rash.   Neurological: Negative for headaches.   Endo/Heme/Allergies: Does not bruise/bleed easily.   Psychiatric/Behavioral: The patient is not nervous/anxious.    All other systems reviewed and are negative.       Objective:      Physical Examination:    Vital Signs:  There were no vitals filed for this visit.    Body mass index is 32.28 kg/m².    This a well-developed, well nourished patient in no acute distress.  They are alert and oriented and cooperative to examination.     Right knee exam:  Skin to his right knee is clean dry and intact.  There is no erythema or ecchymosis.  There are no signs or symptoms of infection.  Patient is neurovascularly intact throughout the right lower extremity.  Right knee active range of motion is approximately 0-110 degrees.  Right knee is stable to varus and valgus stresses while held in extension.  Right calf is soft and nontender.  He has a negative straight leg raise on the right.  He has well-preserved internal/external rotation of the right hip without pain.  He does have some mild diffuse tenderness medially.  He does have a sense of fullness in the popliteal  fossa but no palpable Smith cyst.  He does have a mild effusion.  He is tender to palpation about the medial and lateral patella facets of his right knee.  He can weightbear as tolerated right lower extremity and has a nonantalgic gait.    Pertinent New Results:        XRAY Report / Interpretation:   Three views were taken of the right knee today:  AP, lateral, and sunrise views.  They reveal no acute fractures or dislocations.  Visualized soft tissues are unremarkable.  Patient does have patellofemoral arthrosis with an inferior patellar osteophyte seen on lateral view.      Assessment:       1. Patellofemoral arthritis of right knee    2. Arthritis of knee, right      Plan:     Patellofemoral arthritis of right knee  -     X-Ray Knee 3 View Right  -     Large Joint Aspiration/Injection: R knee    Arthritis of knee, right  -     Cancel: X-Ray Lumbar Spine Ap And Lateral  -     Cancel: X-Ray Pelvis Routine AP  -     X-Ray Knee 3 View Right  -     Large Joint Aspiration/Injection: R knee        Follow up in about 3 months (around 10/8/2022) for 3 mth f/up Right knee inj 7/8/22.    I injected the patient's right knee today via an anterior lateral approach with 40 mg of Kenalog and lidocaine.  He tolerated this well.  We will check him back in 3 months to see how he is doing with this injection.  I advised if he is doing quite well he can simply cancel that appointment.  Conversely, total he was not much improved within 2 weeks, he should follow up with us sooner for further evaluation and treatment options.  Consideration of an MRI may be warranted to evaluate his patellofemoral joint to see if he could benefit from an arthroscopic debridement.        Cesar Capps, MARIA LUZS, PA-C    This note was created using [employer] voice recognition software that occasionally misinterprets words or phrases.

## 2022-07-08 NOTE — PROCEDURES
Large Joint Aspiration/Injection: R knee    Date/Time: 7/8/2022 10:15 AM  Performed by: Cesar Capps PA-C  Authorized by: Cesar Capps PA-C     Consent Done?:  Yes (Verbal)  Indications:  Pain and arthritis  Site marked: the procedure site was marked    Timeout: prior to procedure the correct patient, procedure, and site was verified    Prep: patient was prepped and draped in usual sterile fashion      Local anesthesia used?: Yes    Local anesthetic:  Lidocaine 1% without epinephrine    Details:  Needle Size:  25 G  Ultrasonic Guidance for needle placement?: No    Location:  Knee  Site:  R knee  Medications:  40 mg triamcinolone acetonide 40 mg/mL  Patient tolerance:  Patient tolerated the procedure well with no immediate complications

## 2022-07-15 ENCOUNTER — LAB VISIT (OUTPATIENT)
Dept: LAB | Facility: HOSPITAL | Age: 52
End: 2022-07-15
Attending: UROLOGY
Payer: COMMERCIAL

## 2022-07-15 DIAGNOSIS — E29.1 HYPOGONADISM IN MALE: ICD-10-CM

## 2022-07-15 DIAGNOSIS — R53.83 FATIGUE, UNSPECIFIED TYPE: ICD-10-CM

## 2022-07-15 LAB
BASOPHILS # BLD AUTO: 0.08 K/UL (ref 0–0.2)
BASOPHILS NFR BLD: 0.6 % (ref 0–1.9)
COMPLEXED PSA SERPL-MCNC: 0.54 NG/ML (ref 0–4)
DIFFERENTIAL METHOD: ABNORMAL
EOSINOPHIL # BLD AUTO: 0.1 K/UL (ref 0–0.5)
EOSINOPHIL NFR BLD: 1 % (ref 0–8)
ERYTHROCYTE [DISTWIDTH] IN BLOOD BY AUTOMATED COUNT: 12.6 % (ref 11.5–14.5)
HCT VFR BLD AUTO: 45.2 % (ref 40–54)
HGB BLD-MCNC: 15.6 G/DL (ref 14–18)
IMM GRANULOCYTES # BLD AUTO: 0.09 K/UL (ref 0–0.04)
IMM GRANULOCYTES NFR BLD AUTO: 0.6 % (ref 0–0.5)
LYMPHOCYTES # BLD AUTO: 4.4 K/UL (ref 1–4.8)
LYMPHOCYTES NFR BLD: 31.5 % (ref 18–48)
MCH RBC QN AUTO: 32 PG (ref 27–31)
MCHC RBC AUTO-ENTMCNC: 34.5 G/DL (ref 32–36)
MCV RBC AUTO: 93 FL (ref 82–98)
MONOCYTES # BLD AUTO: 1 K/UL (ref 0.3–1)
MONOCYTES NFR BLD: 7.3 % (ref 4–15)
NEUTROPHILS # BLD AUTO: 8.2 K/UL (ref 1.8–7.7)
NEUTROPHILS NFR BLD: 59 % (ref 38–73)
NRBC BLD-RTO: 0 /100 WBC
PLATELET # BLD AUTO: 220 K/UL (ref 150–450)
PMV BLD AUTO: 9.6 FL (ref 9.2–12.9)
RBC # BLD AUTO: 4.88 M/UL (ref 4.6–6.2)
TESTOST SERPL-MCNC: 169 NG/DL (ref 304–1227)
WBC # BLD AUTO: 13.89 K/UL (ref 3.9–12.7)

## 2022-07-15 PROCEDURE — 36415 COLL VENOUS BLD VENIPUNCTURE: CPT | Mod: PO | Performed by: UROLOGY

## 2022-07-15 PROCEDURE — 84153 ASSAY OF PSA TOTAL: CPT | Performed by: UROLOGY

## 2022-07-15 PROCEDURE — 85025 COMPLETE CBC W/AUTO DIFF WBC: CPT | Performed by: UROLOGY

## 2022-07-15 PROCEDURE — 84403 ASSAY OF TOTAL TESTOSTERONE: CPT | Performed by: UROLOGY

## 2022-07-19 ENCOUNTER — TELEPHONE (OUTPATIENT)
Dept: UROLOGY | Facility: CLINIC | Age: 52
End: 2022-07-19
Payer: COMMERCIAL

## 2022-07-19 ENCOUNTER — PATIENT MESSAGE (OUTPATIENT)
Dept: UROLOGY | Facility: CLINIC | Age: 52
End: 2022-07-19
Payer: COMMERCIAL

## 2022-07-19 DIAGNOSIS — E29.1 HYPOGONADISM IN MALE: Primary | ICD-10-CM

## 2022-07-19 RX ORDER — TESTOSTERONE ENANTHATE 200 MG/ML
200 VIAL (ML) INTRAMUSCULAR
Qty: 10 ML | Refills: 2 | Status: SHIPPED | OUTPATIENT
Start: 2022-07-19 | End: 2022-12-23

## 2022-07-19 NOTE — TELEPHONE ENCOUNTER
Discussed cream vs injections . Proceed with injections  T200 q2 weeks (could always do 100q 1week)- 1st injection here to learn. 10mL vials (200mg per 1mL, 9 doses in a vial).      Cbc T and psa in a month (5-7 days after administration)  Cbc T and psa in 4 month (1-2 days prior to shot) - REJI then.      As long as psa doesn't rise or h/h doesn't raise and CHRISTOPHER? Type sx don't worsen - then can change to aveed in 4 months.      Sent message to pcp to establish care. Sees julia already for years for cards.  Pt will make f/u with GI for screening colonoscopy     Will stay with me bc of h/o of father with prostate cancer. Will need to monitor psa and REJI

## 2022-07-27 ENCOUNTER — TELEPHONE (OUTPATIENT)
Dept: UROLOGY | Facility: CLINIC | Age: 52
End: 2022-07-27
Payer: COMMERCIAL

## 2022-07-27 NOTE — TELEPHONE ENCOUNTER
----- Message from Cristino Dennis sent at 7/27/2022  2:08 PM CDT -----  Contact: Patient  Type:  Patient Call          Who Called:PT         Does the patient know what this is regarding?: Pt calling to discuss the 3:00 appt that he can't make ;please advise           Would the patient rather a call back or a response via [facility]? Call           Best Call Back Number:[phone]             Additional Information:

## 2022-08-03 ENCOUNTER — CLINICAL SUPPORT (OUTPATIENT)
Dept: UROLOGY | Facility: CLINIC | Age: 52
End: 2022-08-03
Payer: COMMERCIAL

## 2022-08-03 DIAGNOSIS — E29.1 HYPOGONADISM IN MALE: Primary | ICD-10-CM

## 2022-08-03 PROCEDURE — 99999 PR PBB SHADOW E&M-EST. PATIENT-LVL I: ICD-10-PCS | Mod: PBBFAC,,,

## 2022-08-03 PROCEDURE — 99499 NO LOS: ICD-10-PCS | Mod: S$GLB,,, | Performed by: UROLOGY

## 2022-08-03 PROCEDURE — 96372 THER/PROPH/DIAG INJ SC/IM: CPT | Mod: S$GLB,,, | Performed by: UROLOGY

## 2022-08-03 PROCEDURE — 99999 PR PBB SHADOW E&M-EST. PATIENT-LVL I: CPT | Mod: PBBFAC,,,

## 2022-08-03 PROCEDURE — 99499 UNLISTED E&M SERVICE: CPT | Mod: S$GLB,,, | Performed by: UROLOGY

## 2022-08-03 PROCEDURE — 96372 PR INJECTION,THERAP/PROPH/DIAG2ST, IM OR SUBCUT: ICD-10-PCS | Mod: S$GLB,,, | Performed by: UROLOGY

## 2022-08-03 RX ORDER — TESTOSTERONE CYPIONATE 200 MG/ML
200 INJECTION, SOLUTION INTRAMUSCULAR ONCE
Status: COMPLETED | OUTPATIENT
Start: 2022-08-03 | End: 2022-08-03

## 2022-08-03 RX ADMIN — TESTOSTERONE CYPIONATE 200 MG: 200 INJECTION, SOLUTION INTRAMUSCULAR at 08:08

## 2022-08-03 NOTE — PROGRESS NOTES
Per  patient arrived in office today for testosterone injection.  Testosterone 200mg IM given in right gluteal.  Patient tolerated well no adverse reaction noted.

## 2022-08-09 ENCOUNTER — TELEPHONE (OUTPATIENT)
Dept: CARDIOLOGY | Facility: CLINIC | Age: 52
End: 2022-08-09
Payer: COMMERCIAL

## 2022-08-09 NOTE — TELEPHONE ENCOUNTER
----- Message from Ladarius Ward sent at 8/9/2022  2:06 PM CDT -----  Contact: Mallika  Type: Needs Medical Advice  Who Called: Pt wife Mallika  Symptoms (please be specific):   How long has patient had these symptoms:    Pharmacy name and phone #:    Best Call Back Number: [phone]  Additional Information: Pt wife requesting a call back for a follow up appt.

## 2022-08-16 ENCOUNTER — LAB VISIT (OUTPATIENT)
Dept: LAB | Facility: HOSPITAL | Age: 52
End: 2022-08-16
Attending: STUDENT IN AN ORGANIZED HEALTH CARE EDUCATION/TRAINING PROGRAM
Payer: COMMERCIAL

## 2022-08-16 ENCOUNTER — OFFICE VISIT (OUTPATIENT)
Dept: FAMILY MEDICINE | Facility: CLINIC | Age: 52
End: 2022-08-16
Payer: COMMERCIAL

## 2022-08-16 VITALS
HEIGHT: 70 IN | RESPIRATION RATE: 18 BRPM | DIASTOLIC BLOOD PRESSURE: 82 MMHG | HEART RATE: 67 BPM | SYSTOLIC BLOOD PRESSURE: 134 MMHG | WEIGHT: 241 LBS | BODY MASS INDEX: 34.5 KG/M2 | OXYGEN SATURATION: 97 %

## 2022-08-16 DIAGNOSIS — Z00.00 ROUTINE GENERAL MEDICAL EXAMINATION AT A HEALTH CARE FACILITY: Primary | ICD-10-CM

## 2022-08-16 DIAGNOSIS — Z12.11 ENCOUNTER FOR SCREENING FOR MALIGNANT NEOPLASM OF COLON: ICD-10-CM

## 2022-08-16 DIAGNOSIS — R10.11 RUQ PAIN: ICD-10-CM

## 2022-08-16 DIAGNOSIS — Z00.00 ROUTINE GENERAL MEDICAL EXAMINATION AT A HEALTH CARE FACILITY: ICD-10-CM

## 2022-08-16 LAB
ALBUMIN SERPL BCP-MCNC: 4.6 G/DL (ref 3.5–5.2)
ALP SERPL-CCNC: 50 U/L (ref 55–135)
ALT SERPL W/O P-5'-P-CCNC: 32 U/L (ref 10–44)
ANION GAP SERPL CALC-SCNC: 11 MMOL/L (ref 8–16)
AST SERPL-CCNC: 22 U/L (ref 10–40)
BILIRUB SERPL-MCNC: 0.6 MG/DL (ref 0.1–1)
BUN SERPL-MCNC: 14 MG/DL (ref 6–20)
CALCIUM SERPL-MCNC: 9.9 MG/DL (ref 8.7–10.5)
CHLORIDE SERPL-SCNC: 103 MMOL/L (ref 95–110)
CHOLEST SERPL-MCNC: 225 MG/DL (ref 120–199)
CHOLEST/HDLC SERPL: 5.8 {RATIO} (ref 2–5)
CO2 SERPL-SCNC: 26 MMOL/L (ref 23–29)
CREAT SERPL-MCNC: 1.2 MG/DL (ref 0.5–1.4)
EST. GFR  (NO RACE VARIABLE): >60 ML/MIN/1.73 M^2
ESTIMATED AVG GLUCOSE: 111 MG/DL (ref 68–131)
GLUCOSE SERPL-MCNC: 106 MG/DL (ref 70–110)
HBA1C MFR BLD: 5.5 % (ref 4–5.6)
HDLC SERPL-MCNC: 39 MG/DL (ref 40–75)
HDLC SERPL: 17.3 % (ref 20–50)
LDLC SERPL CALC-MCNC: 139 MG/DL (ref 63–159)
NONHDLC SERPL-MCNC: 186 MG/DL
POTASSIUM SERPL-SCNC: 4.6 MMOL/L (ref 3.5–5.1)
PROT SERPL-MCNC: 7.3 G/DL (ref 6–8.4)
SODIUM SERPL-SCNC: 140 MMOL/L (ref 136–145)
TRIGL SERPL-MCNC: 235 MG/DL (ref 30–150)

## 2022-08-16 PROCEDURE — 80061 LIPID PANEL: CPT | Performed by: STUDENT IN AN ORGANIZED HEALTH CARE EDUCATION/TRAINING PROGRAM

## 2022-08-16 PROCEDURE — 36415 COLL VENOUS BLD VENIPUNCTURE: CPT | Mod: PO | Performed by: STUDENT IN AN ORGANIZED HEALTH CARE EDUCATION/TRAINING PROGRAM

## 2022-08-16 PROCEDURE — 3075F SYST BP GE 130 - 139MM HG: CPT | Mod: CPTII,S$GLB,, | Performed by: STUDENT IN AN ORGANIZED HEALTH CARE EDUCATION/TRAINING PROGRAM

## 2022-08-16 PROCEDURE — 3079F DIAST BP 80-89 MM HG: CPT | Mod: CPTII,S$GLB,, | Performed by: STUDENT IN AN ORGANIZED HEALTH CARE EDUCATION/TRAINING PROGRAM

## 2022-08-16 PROCEDURE — 4010F PR ACE/ARB THEARPY RXD/TAKEN: ICD-10-PCS | Mod: CPTII,S$GLB,, | Performed by: STUDENT IN AN ORGANIZED HEALTH CARE EDUCATION/TRAINING PROGRAM

## 2022-08-16 PROCEDURE — 4010F ACE/ARB THERAPY RXD/TAKEN: CPT | Mod: CPTII,S$GLB,, | Performed by: STUDENT IN AN ORGANIZED HEALTH CARE EDUCATION/TRAINING PROGRAM

## 2022-08-16 PROCEDURE — 1159F PR MEDICATION LIST DOCUMENTED IN MEDICAL RECORD: ICD-10-PCS | Mod: CPTII,S$GLB,, | Performed by: STUDENT IN AN ORGANIZED HEALTH CARE EDUCATION/TRAINING PROGRAM

## 2022-08-16 PROCEDURE — 80053 COMPREHEN METABOLIC PANEL: CPT | Performed by: STUDENT IN AN ORGANIZED HEALTH CARE EDUCATION/TRAINING PROGRAM

## 2022-08-16 PROCEDURE — 83036 HEMOGLOBIN GLYCOSYLATED A1C: CPT | Performed by: STUDENT IN AN ORGANIZED HEALTH CARE EDUCATION/TRAINING PROGRAM

## 2022-08-16 PROCEDURE — 99386 PR PREVENTIVE VISIT,NEW,40-64: ICD-10-PCS | Mod: S$GLB,,, | Performed by: STUDENT IN AN ORGANIZED HEALTH CARE EDUCATION/TRAINING PROGRAM

## 2022-08-16 PROCEDURE — 99386 PREV VISIT NEW AGE 40-64: CPT | Mod: S$GLB,,, | Performed by: STUDENT IN AN ORGANIZED HEALTH CARE EDUCATION/TRAINING PROGRAM

## 2022-08-16 PROCEDURE — 3075F PR MOST RECENT SYSTOLIC BLOOD PRESS GE 130-139MM HG: ICD-10-PCS | Mod: CPTII,S$GLB,, | Performed by: STUDENT IN AN ORGANIZED HEALTH CARE EDUCATION/TRAINING PROGRAM

## 2022-08-16 PROCEDURE — 1159F MED LIST DOCD IN RCRD: CPT | Mod: CPTII,S$GLB,, | Performed by: STUDENT IN AN ORGANIZED HEALTH CARE EDUCATION/TRAINING PROGRAM

## 2022-08-16 PROCEDURE — 99999 PR PBB SHADOW E&M-EST. PATIENT-LVL V: CPT | Mod: PBBFAC,,, | Performed by: STUDENT IN AN ORGANIZED HEALTH CARE EDUCATION/TRAINING PROGRAM

## 2022-08-16 PROCEDURE — 3079F PR MOST RECENT DIASTOLIC BLOOD PRESSURE 80-89 MM HG: ICD-10-PCS | Mod: CPTII,S$GLB,, | Performed by: STUDENT IN AN ORGANIZED HEALTH CARE EDUCATION/TRAINING PROGRAM

## 2022-08-16 PROCEDURE — 99999 PR PBB SHADOW E&M-EST. PATIENT-LVL V: ICD-10-PCS | Mod: PBBFAC,,, | Performed by: STUDENT IN AN ORGANIZED HEALTH CARE EDUCATION/TRAINING PROGRAM

## 2022-08-16 PROCEDURE — 3008F PR BODY MASS INDEX (BMI) DOCUMENTED: ICD-10-PCS | Mod: CPTII,S$GLB,, | Performed by: STUDENT IN AN ORGANIZED HEALTH CARE EDUCATION/TRAINING PROGRAM

## 2022-08-16 PROCEDURE — 3008F BODY MASS INDEX DOCD: CPT | Mod: CPTII,S$GLB,, | Performed by: STUDENT IN AN ORGANIZED HEALTH CARE EDUCATION/TRAINING PROGRAM

## 2022-08-16 NOTE — PROGRESS NOTES
[facility] MEDICINE CLINIC NOTE    Patient Name: Nancy Retana  YOB: 1970    PRESENTING HISTORY   Chief Complaint:   Chief Complaint   Patient presents with    Establish Care        History of Present Illness:  Mr. Nancy Retana is a 51 y.o. male here to establish care.     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   For RUQ pain.   Onset several days. Did not change with eating. No N/V associated.   Told there was some kind of abnormality with biliary tree but no gallstones based on US  Not told to f/u with a surgeon, told to f/u with GI.     Has never had a colonoscopy.     Follows regularly with a cardiologist.     Hypogonadism on replacement per Urology.     He is working on losing weight. Down from 260 doing [employer].                                    Review of Systems   All other systems reviewed and are negative.        PAST HISTORY:     Past Medical History:   Diagnosis Date    Hypertension        Past Surgical History:   Procedure Laterality Date    CARDIAC ELECTROPHYSIOLOGY STUDY AND ABLATION      TONSILLECTOMY, ADENOIDECTOMY      VASECTOMY         History reviewed. No pertinent family history.    Social History     Socioeconomic History    Marital status:    Tobacco Use    Smoking status: Never Smoker    Smokeless tobacco: Never Used   Substance and Sexual Activity    Alcohol use: Not Currently    Drug use: Never       MEDICATIONS & ALLERGIES:     Current Outpatient Medications on File Prior to Visit   Medication Sig    aspirin 81 mg Cap Take 81 mg by mouth.    DOCOSAHEXANOIC ACID/EPA (FISH OIL ORAL) Take by mouth.    FOLIC ACID ORAL Take by mouth.    GREEN TEA LEAF EXTRACT (GREEN TEA ORAL) Take by mouth.    KRILL OIL ORAL Take by mouth.    losartan (COZAAR) 50 MG tablet Take 50 mg by mouth.    metoprolol succinate (TOPROL-XL) 25 MG  "24 hr tablet Take 1 tablet (25 mg total) by mouth once daily.    multivitamin capsule Take 1 capsule by mouth once daily.    NAPROXEN SODIUM (ALEVE ORAL) Take by mouth.    olmesartan-hydrochlorothiazide (BENICAR HCT) 20-12.5 mg per tablet Take 1 tablet by mouth once daily.    potassium chloride SA (K-DUR,KLOR-CON) 10 MEQ tablet Take 2 tablets (20 mEq total) by mouth 2 (two) times daily.    RED YEAST RICE ORAL Take by mouth.    testosterone enanthate (DELATESTRYL) 200 mg/mL injection Inject 1 mL (200 mg total) into the muscle every 14 (fourteen) days.    TURMERIC, BULK, MISC by Misc.(Non-Drug; Combo Route) route.    UBIDECARENONE (CO Q-10 ORAL) Take by mouth.    metoprolol tartrate (LOPRESSOR) 25 MG tablet Take 25 mg by mouth.     No current facility-administered medications on file prior to visit.       Review of patient's allergies indicates:  No Known Allergies    OBJECTIVE:   Vital Signs:  Vitals:    08/16/22 0821   BP: 134/82   Pulse: 67   Resp: 18   SpO2: 97%   Weight: 109.3 kg (241 lb)   Height: 5' 10" (1.778 m)       No results found for this or any previous visit (from the past 24 hour(s)).      Physical Exam  Vitals and nursing note reviewed.   Constitutional:       General: He is not in acute distress.     Appearance: He is not toxic-appearing or diaphoretic.   HENT:      Head: Normocephalic and atraumatic.      Right Ear: External ear normal.      Left Ear: External ear normal.   Eyes:      General: No scleral icterus.     Conjunctiva/sclera: Conjunctivae normal.      Pupils: Pupils are equal, round, and reactive to light.   Neck:      Thyroid: No thyromegaly.      Vascular: No carotid bruit.   Cardiovascular:      Rate and Rhythm: Normal rate and regular rhythm.      Heart sounds: Normal heart sounds. No murmur heard.  Pulmonary:      Effort: Pulmonary effort is normal. No respiratory distress.      Breath sounds: Normal breath sounds. No wheezing or rales.   Abdominal:      General: Abdomen is " flat. Bowel sounds are normal. There is no distension.      Palpations: Abdomen is soft.      Tenderness: There is no abdominal tenderness.   Musculoskeletal:         General: No tenderness or deformity. Normal range of motion.      Cervical back: Normal range of motion and neck supple.      Right lower leg: No edema.      Left lower leg: No edema.   Lymphadenopathy:      Cervical: No cervical adenopathy.   Skin:     General: Skin is warm and dry.      Findings: No erythema or rash.   Neurological:      Mental Status: He is alert and oriented to person, place, and time.      Gait: Gait is intact.   Psychiatric:         Mood and Affect: Mood and affect normal.         Cognition and Memory: Memory normal.         Judgment: Judgment normal.         ASSESSMENT & PLAN:     Routine general medical examination at a health care facility  -     Comprehensive Metabolic Panel; Future; Expected date: 08/16/2022  -     Lipid Panel; Future; Expected date: 08/16/2022  -     Hemoglobin A1C; Future; Expected date: 08/16/2022    Encounter for screening for malignant neoplasm of colon  -     Ambulatory referral/consult to Gastroenterology; Future; Expected date: 08/23/2022    RUQ pain  Need records to prevent duplication of testing. Records request sent.       Evens Sandoval MD   Internal Medicine    This note was created using Dragon voice recognition software that occasionally misinterprets phrases or words.

## 2022-08-17 ENCOUNTER — CLINICAL SUPPORT (OUTPATIENT)
Dept: UROLOGY | Facility: CLINIC | Age: 52
End: 2022-08-17
Payer: COMMERCIAL

## 2022-08-17 DIAGNOSIS — E29.1 HYPOGONADISM IN MALE: Primary | ICD-10-CM

## 2022-08-17 PROCEDURE — 96372 THER/PROPH/DIAG INJ SC/IM: CPT | Mod: S$GLB,,, | Performed by: UROLOGY

## 2022-08-17 PROCEDURE — 99999 PR PBB SHADOW E&M-EST. PATIENT-LVL I: CPT | Mod: PBBFAC,,,

## 2022-08-17 PROCEDURE — 96372 PR INJECTION,THERAP/PROPH/DIAG2ST, IM OR SUBCUT: ICD-10-PCS | Mod: S$GLB,,, | Performed by: UROLOGY

## 2022-08-17 PROCEDURE — 99499 NO LOS: ICD-10-PCS | Mod: S$GLB,,, | Performed by: UROLOGY

## 2022-08-17 PROCEDURE — 99999 PR PBB SHADOW E&M-EST. PATIENT-LVL I: ICD-10-PCS | Mod: PBBFAC,,,

## 2022-08-17 PROCEDURE — 99499 UNLISTED E&M SERVICE: CPT | Mod: S$GLB,,, | Performed by: UROLOGY

## 2022-08-17 RX ORDER — TESTOSTERONE CYPIONATE 200 MG/ML
200 INJECTION, SOLUTION INTRAMUSCULAR ONCE
Status: COMPLETED | OUTPATIENT
Start: 2022-08-17 | End: 2022-08-17

## 2022-08-17 RX ADMIN — TESTOSTERONE CYPIONATE 200 MG: 200 INJECTION, SOLUTION INTRAMUSCULAR at 09:08

## 2022-08-17 NOTE — PROGRESS NOTES
Per  patient arrived in office today for testosterone injection.  Testosterone 200mg IM given in left gluteal.  Patient tolerated well no adverse reaction noted

## 2022-08-18 ENCOUNTER — DOCUMENTATION ONLY (OUTPATIENT)
Dept: FAMILY MEDICINE | Facility: CLINIC | Age: 52
End: 2022-08-18
Payer: COMMERCIAL

## 2022-08-18 NOTE — CLINICAL REVIEW
Reviewed outside records from ED visit.     US abdomen sign only for fatty liver. No concerning biliary features.     Evens Sandoval MD

## 2022-08-31 ENCOUNTER — PATIENT MESSAGE (OUTPATIENT)
Dept: UROLOGY | Facility: CLINIC | Age: 52
End: 2022-08-31
Payer: COMMERCIAL

## 2022-09-01 ENCOUNTER — PATIENT MESSAGE (OUTPATIENT)
Dept: UROLOGY | Facility: CLINIC | Age: 52
End: 2022-09-01
Payer: COMMERCIAL

## 2022-09-09 RX ORDER — POTASSIUM CHLORIDE 750 MG/1
20 TABLET, EXTENDED RELEASE ORAL 2 TIMES DAILY
Qty: 360 TABLET | Refills: 3 | Status: SHIPPED | OUTPATIENT
Start: 2022-09-09 | End: 2023-09-09

## 2022-09-09 NOTE — TELEPHONE ENCOUNTER
----- Message from Mainor Anderson MA sent at 9/9/2022 10:55 AM CDT -----  Contact: JACI ARROYO [[id number]]  Type: Needs Medical Advice    Who Called: JACI ARROYO [[id number]]  Best Call Back Number: [phone]  Inquiry/Question: Please call JACI ARROYO [[id number]] regarding appt concerns      Thank you~

## 2022-09-09 NOTE — TELEPHONE ENCOUNTER
----- Message from Genaro Crespo sent at 9/9/2022 10:57 AM CDT -----  Regarding: appointment  Contact: patient  Patient want to know why his appointment keep being rescheduled, please call back at [phone] (home) [phone] (work)

## 2022-09-12 ENCOUNTER — OFFICE VISIT (OUTPATIENT)
Dept: ORTHOPEDICS | Facility: CLINIC | Age: 52
End: 2022-09-12
Payer: COMMERCIAL

## 2022-09-12 VITALS
SYSTOLIC BLOOD PRESSURE: 132 MMHG | HEIGHT: 70 IN | DIASTOLIC BLOOD PRESSURE: 92 MMHG | BODY MASS INDEX: 33.07 KG/M2 | WEIGHT: 231 LBS

## 2022-09-12 DIAGNOSIS — M50.30 DDD (DEGENERATIVE DISC DISEASE), CERVICAL: ICD-10-CM

## 2022-09-12 DIAGNOSIS — M19.012 ARTHRITIS OF LEFT STERNOCLAVICULAR JOINT: Primary | ICD-10-CM

## 2022-09-12 PROCEDURE — 1160F RVW MEDS BY RX/DR IN RCRD: CPT | Mod: CPTII,S$GLB,, | Performed by: PHYSICIAN ASSISTANT

## 2022-09-12 PROCEDURE — 99213 PR OFFICE/OUTPT VISIT, EST, LEVL III, 20-29 MIN: ICD-10-PCS | Mod: S$GLB,,, | Performed by: PHYSICIAN ASSISTANT

## 2022-09-12 PROCEDURE — 4010F ACE/ARB THERAPY RXD/TAKEN: CPT | Mod: CPTII,S$GLB,, | Performed by: PHYSICIAN ASSISTANT

## 2022-09-12 PROCEDURE — 4010F PR ACE/ARB THEARPY RXD/TAKEN: ICD-10-PCS | Mod: CPTII,S$GLB,, | Performed by: PHYSICIAN ASSISTANT

## 2022-09-12 PROCEDURE — 1160F PR REVIEW ALL MEDS BY PRESCRIBER/CLIN PHARMACIST DOCUMENTED: ICD-10-PCS | Mod: CPTII,S$GLB,, | Performed by: PHYSICIAN ASSISTANT

## 2022-09-12 PROCEDURE — 1159F MED LIST DOCD IN RCRD: CPT | Mod: CPTII,S$GLB,, | Performed by: PHYSICIAN ASSISTANT

## 2022-09-12 PROCEDURE — 3044F HG A1C LEVEL LT 7.0%: CPT | Mod: CPTII,S$GLB,, | Performed by: PHYSICIAN ASSISTANT

## 2022-09-12 PROCEDURE — 3080F PR MOST RECENT DIASTOLIC BLOOD PRESSURE >= 90 MM HG: ICD-10-PCS | Mod: CPTII,S$GLB,, | Performed by: PHYSICIAN ASSISTANT

## 2022-09-12 PROCEDURE — 3008F PR BODY MASS INDEX (BMI) DOCUMENTED: ICD-10-PCS | Mod: CPTII,S$GLB,, | Performed by: PHYSICIAN ASSISTANT

## 2022-09-12 PROCEDURE — 3075F SYST BP GE 130 - 139MM HG: CPT | Mod: CPTII,S$GLB,, | Performed by: PHYSICIAN ASSISTANT

## 2022-09-12 PROCEDURE — 3008F BODY MASS INDEX DOCD: CPT | Mod: CPTII,S$GLB,, | Performed by: PHYSICIAN ASSISTANT

## 2022-09-12 PROCEDURE — 3044F PR MOST RECENT HEMOGLOBIN A1C LEVEL <7.0%: ICD-10-PCS | Mod: CPTII,S$GLB,, | Performed by: PHYSICIAN ASSISTANT

## 2022-09-12 PROCEDURE — 1159F PR MEDICATION LIST DOCUMENTED IN MEDICAL RECORD: ICD-10-PCS | Mod: CPTII,S$GLB,, | Performed by: PHYSICIAN ASSISTANT

## 2022-09-12 PROCEDURE — 3075F PR MOST RECENT SYSTOLIC BLOOD PRESS GE 130-139MM HG: ICD-10-PCS | Mod: CPTII,S$GLB,, | Performed by: PHYSICIAN ASSISTANT

## 2022-09-12 PROCEDURE — 99213 OFFICE O/P EST LOW 20 MIN: CPT | Mod: S$GLB,,, | Performed by: PHYSICIAN ASSISTANT

## 2022-09-12 PROCEDURE — 3080F DIAST BP >= 90 MM HG: CPT | Mod: CPTII,S$GLB,, | Performed by: PHYSICIAN ASSISTANT

## 2022-09-12 NOTE — PROGRESS NOTES
[facility] ORTHOPEDICS  [address]  [place] [address]  Phone: [phone]   [phone]    Patient's PCP: Evens Sandoval MD  Referring Provider: No ref. provider found    Subjective:      Chief Complaint:   Chief Complaint   Patient presents with    Left Shoulder - Pain     Pt is here with complaints of Left Shoulder pain x 1 1/12 weeks, feels like bee's stinging or like you are on fire, pain comes and goes, hurts to take a deep breath. ROM is painful. Has a little bit of neck pain. No know injury.        Past Medical History:   Diagnosis Date    Hypertension        Past Surgical History:   Procedure Laterality Date    CARDIAC ELECTROPHYSIOLOGY STUDY AND ABLATION      TONSILLECTOMY, ADENOIDECTOMY      VASECTOMY         Current Outpatient Medications   Medication Sig    aspirin 81 mg Cap Take 81 mg by mouth.    DOCOSAHEXANOIC ACID/EPA (FISH OIL ORAL) Take by mouth.    FOLIC ACID ORAL Take by mouth.    GREEN TEA LEAF EXTRACT (GREEN TEA ORAL) Take by mouth.    KRILL OIL ORAL Take by mouth.    losartan (COZAAR) 50 MG tablet Take 50 mg by mouth.    metoprolol succinate (TOPROL-XL) 25 MG 24 hr tablet Take 1 tablet (25 mg total) by mouth once daily.    multivitamin capsule Take 1 capsule by mouth once daily.    NAPROXEN SODIUM (ALEVE ORAL) Take by mouth.    olmesartan-hydrochlorothiazide (BENICAR HCT) 20-12.5 mg per tablet Take 1 tablet by mouth once daily.    potassium chloride SA (K-DUR,KLOR-CON M) 10 MEQ tablet Take 2 tablets (20 mEq total) by mouth 2 (two) times daily.    RED YEAST RICE ORAL Take by mouth.    testosterone enanthate (DELATESTRYL) 200 mg/mL injection Inject 1 mL (200 mg total) into the muscle every 14 (fourteen) days.    TURMERIC, BULK, MISC by Misc.(Non-Drug; Combo Route) route.    UBIDECARENONE (CO Q-10 ORAL) Take by mouth.    metoprolol tartrate (LOPRESSOR) 25 MG tablet Take 25 mg by mouth.     No current facility-administered medications for this visit.       Review of  patient's allergies indicates:  No Known Allergies    History reviewed. No pertinent family history.    Social History     Socioeconomic History    Marital status:    Tobacco Use    Smoking status: Never    Smokeless tobacco: Never   Substance and Sexual Activity    Alcohol use: Not Currently    Drug use: Never       History of present illness:  Nancy presents to the clinic today with a chief complaint of left shoulder pain that has been going on for approximately 2 weeks.  He denies any injury or trauma to his shoulder.  He is right-hand dominant.  He does not complain weakness or actual true pain per se, more less paresthesias with burning/sensation of bees stinging him about the left shoulder and chest.    Review of Systems:    Constitutional: Negative for chills, fever and weight loss.   HENT: Negative for congestion.    Eyes: Negative for discharge and redness.   Respiratory: Negative for cough and shortness of breath.    Cardiovascular: Negative for chest pain.   Gastrointestinal: Negative for nausea and vomiting.   Musculoskeletal: See HPI.   Skin: Negative for rash.   Neurological: Negative for headaches.   Endo/Heme/Allergies: Does not bruise/bleed easily.   Psychiatric/Behavioral: The patient is not nervous/anxious.    All other systems reviewed and are negative.       Objective:      Physical Examination:    Vital Signs:    Vitals:    09/12/22 0819   BP: (!) 132/92       Body mass index is 33.15 kg/m².    This a well-developed, well nourished patient in no acute distress.  They are alert and oriented and cooperative to examination.     Left shoulder exam:  Skin to the left shoulder is clean dry and intact.  There is no erythema or ecchymosis.  There are no signs or symptoms of infection.  Patient is neurovascularly intact throughout the left upper extremity.  He has full active range of motion left shoulder with forward flexion, external rotation, internal rotation, and abduction.  His left  rotator cuff is intact to resisted muscle testing and strong.  It is nontender.  He has a negative Neer impingement sign.  He has a negative Brandt test.  He is nontender palpation of his left acromioclavicular joint and has a negative crossover test.  He is tender to palpation over his left sternoclavicular joint and he localizes this as the point of maximal tenderness for him and the origin of his discomfort.    Pertinent New Results:        XRAY Report / Interpretation:   Two views were taken of his left shoulder today:  AP and Y-view.  They reveal no fractures or dislocations.  Patient does have some mild AC joint osteoarthritis trumpeting of the distal clavicle.  Visualized soft tissues are unremarkable.    Two views were taken of his cervical spine today:  AP and lateral views.  They reveal no acute fractures or dislocations.  Patient does have mild degenerative disc disease throughout his cervical spine worst at the C5-6 level.  He has mild loss of cervical lordosis.  He does have severe osteoarthritis in the sternoclavicular joint on the left.      Assessment:       1. Arthritis of left sternoclavicular joint    2. DDD (degenerative disc disease), cervical      Plan:     Arthritis of left sternoclavicular joint  -     X-Ray Shoulder 2 or More Views Left    DDD (degenerative disc disease), cervical  -     X-Ray Cervical Spine AP And Lateral      Follow up if symptoms worsen or fail to improve.    Patient will start over-the-counter Motrin as a generalized anti-inflammatory.  I did advise him if he did not get much relief with this, to let me know through the patient portal I will start him on a Medrol Dosepak.  He can follow up with us on an as-needed basis if he has any continuation or worsening of symptoms.        Cesar Capps, MARIA LUZS, PA-C    This note was created using [employer] voice recognition software that occasionally misinterprets words or phrases.

## 2022-09-14 ENCOUNTER — CLINICAL SUPPORT (OUTPATIENT)
Dept: UROLOGY | Facility: CLINIC | Age: 52
End: 2022-09-14
Payer: COMMERCIAL

## 2022-09-14 DIAGNOSIS — E29.1 HYPOGONADISM IN MALE: Primary | ICD-10-CM

## 2022-09-14 PROCEDURE — 99499 NO LOS: ICD-10-PCS | Mod: S$GLB,,, | Performed by: UROLOGY

## 2022-09-14 PROCEDURE — 96372 THER/PROPH/DIAG INJ SC/IM: CPT | Mod: S$GLB,,, | Performed by: UROLOGY

## 2022-09-14 PROCEDURE — 99499 UNLISTED E&M SERVICE: CPT | Mod: S$GLB,,, | Performed by: UROLOGY

## 2022-09-14 PROCEDURE — 99999 PR PBB SHADOW E&M-EST. PATIENT-LVL I: CPT | Mod: PBBFAC,,,

## 2022-09-14 PROCEDURE — 99999 PR PBB SHADOW E&M-EST. PATIENT-LVL I: ICD-10-PCS | Mod: PBBFAC,,,

## 2022-09-14 PROCEDURE — 96372 PR INJECTION,THERAP/PROPH/DIAG2ST, IM OR SUBCUT: ICD-10-PCS | Mod: S$GLB,,, | Performed by: UROLOGY

## 2022-09-14 RX ORDER — TESTOSTERONE CYPIONATE 200 MG/ML
200 INJECTION, SOLUTION INTRAMUSCULAR ONCE
Status: COMPLETED | OUTPATIENT
Start: 2022-09-14 | End: 2022-09-14

## 2022-09-14 RX ADMIN — TESTOSTERONE CYPIONATE 200 MG: 200 INJECTION, SOLUTION INTRAMUSCULAR at 08:09

## 2022-09-14 NOTE — PROGRESS NOTES
Per  patient arrived in office today for testosterone injection.  Testosterone 200mg IM given in right gluteal.  Patient tolerated well no adverse reaction noted

## 2022-09-21 ENCOUNTER — PATIENT MESSAGE (OUTPATIENT)
Dept: CARDIOLOGY | Facility: CLINIC | Age: 52
End: 2022-09-21
Payer: COMMERCIAL

## 2022-09-28 ENCOUNTER — CLINICAL SUPPORT (OUTPATIENT)
Dept: UROLOGY | Facility: CLINIC | Age: 52
End: 2022-09-28
Payer: COMMERCIAL

## 2022-09-28 DIAGNOSIS — E29.1 HYPOGONADISM IN MALE: Primary | ICD-10-CM

## 2022-09-28 PROCEDURE — 96372 THER/PROPH/DIAG INJ SC/IM: CPT | Mod: S$GLB,,, | Performed by: UROLOGY

## 2022-09-28 PROCEDURE — 99999 PR PBB SHADOW E&M-EST. PATIENT-LVL II: ICD-10-PCS | Mod: PBBFAC,,,

## 2022-09-28 PROCEDURE — 99499 UNLISTED E&M SERVICE: CPT | Mod: S$GLB,,, | Performed by: UROLOGY

## 2022-09-28 PROCEDURE — 96372 PR INJECTION,THERAP/PROPH/DIAG2ST, IM OR SUBCUT: ICD-10-PCS | Mod: S$GLB,,, | Performed by: UROLOGY

## 2022-09-28 PROCEDURE — 99999 PR PBB SHADOW E&M-EST. PATIENT-LVL II: CPT | Mod: PBBFAC,,,

## 2022-09-28 PROCEDURE — 99499 NO LOS: ICD-10-PCS | Mod: S$GLB,,, | Performed by: UROLOGY

## 2022-09-28 RX ORDER — TESTOSTERONE CYPIONATE 200 MG/ML
200 INJECTION, SOLUTION INTRAMUSCULAR ONCE
Status: COMPLETED | OUTPATIENT
Start: 2022-09-28 | End: 2022-09-28

## 2022-09-28 RX ADMIN — TESTOSTERONE CYPIONATE 200 MG: 200 INJECTION, SOLUTION INTRAMUSCULAR at 08:09

## 2022-10-06 ENCOUNTER — LAB VISIT (OUTPATIENT)
Dept: LAB | Facility: HOSPITAL | Age: 52
End: 2022-10-06
Attending: UROLOGY
Payer: COMMERCIAL

## 2022-10-06 DIAGNOSIS — R53.83 FATIGUE, UNSPECIFIED TYPE: ICD-10-CM

## 2022-10-06 DIAGNOSIS — E29.1 HYPOGONADISM IN MALE: ICD-10-CM

## 2022-10-06 LAB
BASOPHILS # BLD AUTO: 0.04 K/UL (ref 0–0.2)
BASOPHILS # BLD AUTO: 0.04 K/UL (ref 0–0.2)
BASOPHILS NFR BLD: 0.5 % (ref 0–1.9)
BASOPHILS NFR BLD: 0.5 % (ref 0–1.9)
COMPLEXED PSA SERPL-MCNC: 0.65 NG/ML (ref 0–4)
COMPLEXED PSA SERPL-MCNC: 0.65 NG/ML (ref 0–4)
DIFFERENTIAL METHOD: ABNORMAL
DIFFERENTIAL METHOD: ABNORMAL
EOSINOPHIL # BLD AUTO: 0.1 K/UL (ref 0–0.5)
EOSINOPHIL # BLD AUTO: 0.1 K/UL (ref 0–0.5)
EOSINOPHIL NFR BLD: 1.5 % (ref 0–8)
EOSINOPHIL NFR BLD: 1.5 % (ref 0–8)
ERYTHROCYTE [DISTWIDTH] IN BLOOD BY AUTOMATED COUNT: 12.8 % (ref 11.5–14.5)
ERYTHROCYTE [DISTWIDTH] IN BLOOD BY AUTOMATED COUNT: 12.8 % (ref 11.5–14.5)
HCT VFR BLD AUTO: 47.1 % (ref 40–54)
HCT VFR BLD AUTO: 47.1 % (ref 40–54)
HGB BLD-MCNC: 16.1 G/DL (ref 14–18)
HGB BLD-MCNC: 16.1 G/DL (ref 14–18)
IMM GRANULOCYTES # BLD AUTO: 0.05 K/UL (ref 0–0.04)
IMM GRANULOCYTES # BLD AUTO: 0.05 K/UL (ref 0–0.04)
IMM GRANULOCYTES NFR BLD AUTO: 0.6 % (ref 0–0.5)
IMM GRANULOCYTES NFR BLD AUTO: 0.6 % (ref 0–0.5)
LYMPHOCYTES # BLD AUTO: 3.6 K/UL (ref 1–4.8)
LYMPHOCYTES # BLD AUTO: 3.6 K/UL (ref 1–4.8)
LYMPHOCYTES NFR BLD: 40.7 % (ref 18–48)
LYMPHOCYTES NFR BLD: 40.7 % (ref 18–48)
MCH RBC QN AUTO: 31.8 PG (ref 27–31)
MCH RBC QN AUTO: 31.8 PG (ref 27–31)
MCHC RBC AUTO-ENTMCNC: 34.2 G/DL (ref 32–36)
MCHC RBC AUTO-ENTMCNC: 34.2 G/DL (ref 32–36)
MCV RBC AUTO: 93 FL (ref 82–98)
MCV RBC AUTO: 93 FL (ref 82–98)
MONOCYTES # BLD AUTO: 0.6 K/UL (ref 0.3–1)
MONOCYTES # BLD AUTO: 0.6 K/UL (ref 0.3–1)
MONOCYTES NFR BLD: 6.5 % (ref 4–15)
MONOCYTES NFR BLD: 6.5 % (ref 4–15)
NEUTROPHILS # BLD AUTO: 4.5 K/UL (ref 1.8–7.7)
NEUTROPHILS # BLD AUTO: 4.5 K/UL (ref 1.8–7.7)
NEUTROPHILS NFR BLD: 50.2 % (ref 38–73)
NEUTROPHILS NFR BLD: 50.2 % (ref 38–73)
NRBC BLD-RTO: 0 /100 WBC
NRBC BLD-RTO: 0 /100 WBC
PLATELET # BLD AUTO: 218 K/UL (ref 150–450)
PLATELET # BLD AUTO: 218 K/UL (ref 150–450)
PMV BLD AUTO: 9.3 FL (ref 9.2–12.9)
PMV BLD AUTO: 9.3 FL (ref 9.2–12.9)
RBC # BLD AUTO: 5.07 M/UL (ref 4.6–6.2)
RBC # BLD AUTO: 5.07 M/UL (ref 4.6–6.2)
TESTOST SERPL-MCNC: 642 NG/DL (ref 304–1227)
TESTOST SERPL-MCNC: 642 NG/DL (ref 304–1227)
WBC # BLD AUTO: 8.88 K/UL (ref 3.9–12.7)
WBC # BLD AUTO: 8.88 K/UL (ref 3.9–12.7)

## 2022-10-06 PROCEDURE — 84153 ASSAY OF PSA TOTAL: CPT | Performed by: UROLOGY

## 2022-10-06 PROCEDURE — 84403 ASSAY OF TOTAL TESTOSTERONE: CPT | Performed by: UROLOGY

## 2022-10-06 PROCEDURE — 36415 COLL VENOUS BLD VENIPUNCTURE: CPT | Performed by: UROLOGY

## 2022-10-06 PROCEDURE — 85025 COMPLETE CBC W/AUTO DIFF WBC: CPT | Performed by: UROLOGY

## 2022-10-27 ENCOUNTER — CLINICAL SUPPORT (OUTPATIENT)
Dept: UROLOGY | Facility: CLINIC | Age: 52
End: 2022-10-27
Payer: COMMERCIAL

## 2022-10-27 DIAGNOSIS — E29.1 HYPOGONADISM IN MALE: Primary | ICD-10-CM

## 2022-10-27 PROCEDURE — 96372 THER/PROPH/DIAG INJ SC/IM: CPT | Mod: S$GLB,,, | Performed by: UROLOGY

## 2022-10-27 PROCEDURE — 99499 NO LOS: ICD-10-PCS | Mod: S$GLB,,, | Performed by: UROLOGY

## 2022-10-27 PROCEDURE — 99999 PR PBB SHADOW E&M-EST. PATIENT-LVL I: CPT | Mod: PBBFAC,,,

## 2022-10-27 PROCEDURE — 96372 PR INJECTION,THERAP/PROPH/DIAG2ST, IM OR SUBCUT: ICD-10-PCS | Mod: S$GLB,,, | Performed by: UROLOGY

## 2022-10-27 PROCEDURE — 99499 UNLISTED E&M SERVICE: CPT | Mod: S$GLB,,, | Performed by: UROLOGY

## 2022-10-27 PROCEDURE — 99999 PR PBB SHADOW E&M-EST. PATIENT-LVL I: ICD-10-PCS | Mod: PBBFAC,,,

## 2022-10-27 RX ORDER — TESTOSTERONE CYPIONATE 200 MG/ML
200 INJECTION, SOLUTION INTRAMUSCULAR ONCE
Status: COMPLETED | OUTPATIENT
Start: 2022-10-27 | End: 2022-10-27

## 2022-10-27 RX ADMIN — TESTOSTERONE CYPIONATE 200 MG: 200 INJECTION, SOLUTION INTRAMUSCULAR at 09:10

## 2022-11-09 ENCOUNTER — CLINICAL SUPPORT (OUTPATIENT)
Dept: UROLOGY | Facility: CLINIC | Age: 52
End: 2022-11-09
Payer: COMMERCIAL

## 2022-11-09 DIAGNOSIS — E29.1 HYPOGONADISM IN MALE: Primary | ICD-10-CM

## 2022-11-09 PROCEDURE — 99999 PR PBB SHADOW E&M-EST. PATIENT-LVL I: CPT | Mod: PBBFAC,,,

## 2022-11-09 PROCEDURE — 99499 UNLISTED E&M SERVICE: CPT | Mod: S$GLB,,, | Performed by: UROLOGY

## 2022-11-09 PROCEDURE — 96372 THER/PROPH/DIAG INJ SC/IM: CPT | Mod: S$GLB,,, | Performed by: UROLOGY

## 2022-11-09 PROCEDURE — 99499 NO LOS: ICD-10-PCS | Mod: S$GLB,,, | Performed by: UROLOGY

## 2022-11-09 PROCEDURE — 99999 PR PBB SHADOW E&M-EST. PATIENT-LVL I: ICD-10-PCS | Mod: PBBFAC,,,

## 2022-11-09 PROCEDURE — 96372 PR INJECTION,THERAP/PROPH/DIAG2ST, IM OR SUBCUT: ICD-10-PCS | Mod: S$GLB,,, | Performed by: UROLOGY

## 2022-11-09 RX ORDER — TESTOSTERONE CYPIONATE 200 MG/ML
200 INJECTION, SOLUTION INTRAMUSCULAR ONCE
Status: COMPLETED | OUTPATIENT
Start: 2022-11-09 | End: 2022-11-09

## 2022-11-09 RX ADMIN — TESTOSTERONE CYPIONATE 200 MG: 200 INJECTION, SOLUTION INTRAMUSCULAR at 09:11

## 2022-11-29 ENCOUNTER — TELEPHONE (OUTPATIENT)
Dept: UROLOGY | Facility: CLINIC | Age: 52
End: 2022-11-29
Payer: COMMERCIAL

## 2022-11-29 NOTE — TELEPHONE ENCOUNTER
----- Message from Wes Quijano sent at 11/29/2022  3:09 PM CST -----  Type: Patient Call Back         Who called: Pt         What is the request in detail: Pt called in regarding a few questions in regards to tomorrows Nurse visit appointment         Can the clinic reply by [facility]? No          Would the patient rather a call back or a response via [facility]? Call back          Best call back number:[phone] (mobile)          Additional Information:           Thank You

## 2022-12-02 ENCOUNTER — CLINICAL SUPPORT (OUTPATIENT)
Dept: UROLOGY | Facility: CLINIC | Age: 52
End: 2022-12-02
Payer: COMMERCIAL

## 2022-12-02 ENCOUNTER — LAB VISIT (OUTPATIENT)
Dept: LAB | Facility: HOSPITAL | Age: 52
End: 2022-12-02
Attending: UROLOGY
Payer: COMMERCIAL

## 2022-12-02 DIAGNOSIS — E29.1 HYPOGONADISM IN MALE: Primary | ICD-10-CM

## 2022-12-02 DIAGNOSIS — E29.1 HYPOGONADISM IN MALE: ICD-10-CM

## 2022-12-02 LAB
BASOPHILS # BLD AUTO: 0.05 K/UL (ref 0–0.2)
BASOPHILS NFR BLD: 0.6 % (ref 0–1.9)
COMPLEXED PSA SERPL-MCNC: 0.37 NG/ML (ref 0–4)
DIFFERENTIAL METHOD: ABNORMAL
EOSINOPHIL # BLD AUTO: 0.1 K/UL (ref 0–0.5)
EOSINOPHIL NFR BLD: 1.4 % (ref 0–8)
ERYTHROCYTE [DISTWIDTH] IN BLOOD BY AUTOMATED COUNT: 12.6 % (ref 11.5–14.5)
HCT VFR BLD AUTO: 50.5 % (ref 40–54)
HGB BLD-MCNC: 17 G/DL (ref 14–18)
IMM GRANULOCYTES # BLD AUTO: 0.03 K/UL (ref 0–0.04)
IMM GRANULOCYTES NFR BLD AUTO: 0.4 % (ref 0–0.5)
LYMPHOCYTES # BLD AUTO: 3.1 K/UL (ref 1–4.8)
LYMPHOCYTES NFR BLD: 37.2 % (ref 18–48)
MCH RBC QN AUTO: 31.2 PG (ref 27–31)
MCHC RBC AUTO-ENTMCNC: 33.7 G/DL (ref 32–36)
MCV RBC AUTO: 93 FL (ref 82–98)
MONOCYTES # BLD AUTO: 0.5 K/UL (ref 0.3–1)
MONOCYTES NFR BLD: 5.6 % (ref 4–15)
NEUTROPHILS # BLD AUTO: 4.6 K/UL (ref 1.8–7.7)
NEUTROPHILS NFR BLD: 54.8 % (ref 38–73)
NRBC BLD-RTO: 0 /100 WBC
PLATELET # BLD AUTO: 201 K/UL (ref 150–450)
PMV BLD AUTO: 9.2 FL (ref 9.2–12.9)
RBC # BLD AUTO: 5.45 M/UL (ref 4.6–6.2)
TESTOST SERPL-MCNC: 122 NG/DL (ref 304–1227)
WBC # BLD AUTO: 8.34 K/UL (ref 3.9–12.7)

## 2022-12-02 PROCEDURE — 96372 PR INJECTION,THERAP/PROPH/DIAG2ST, IM OR SUBCUT: ICD-10-PCS | Mod: S$GLB,,, | Performed by: UROLOGY

## 2022-12-02 PROCEDURE — 84153 ASSAY OF PSA TOTAL: CPT | Performed by: UROLOGY

## 2022-12-02 PROCEDURE — 99499 NO LOS: ICD-10-PCS | Mod: S$GLB,,, | Performed by: UROLOGY

## 2022-12-02 PROCEDURE — 85025 COMPLETE CBC W/AUTO DIFF WBC: CPT | Performed by: UROLOGY

## 2022-12-02 PROCEDURE — 96372 THER/PROPH/DIAG INJ SC/IM: CPT | Mod: S$GLB,,, | Performed by: UROLOGY

## 2022-12-02 PROCEDURE — 84403 ASSAY OF TOTAL TESTOSTERONE: CPT | Performed by: UROLOGY

## 2022-12-02 PROCEDURE — 36415 COLL VENOUS BLD VENIPUNCTURE: CPT | Performed by: UROLOGY

## 2022-12-02 PROCEDURE — 99999 PR PBB SHADOW E&M-EST. PATIENT-LVL I: ICD-10-PCS | Mod: PBBFAC,,,

## 2022-12-02 PROCEDURE — 99499 UNLISTED E&M SERVICE: CPT | Mod: S$GLB,,, | Performed by: UROLOGY

## 2022-12-02 PROCEDURE — 99999 PR PBB SHADOW E&M-EST. PATIENT-LVL I: CPT | Mod: PBBFAC,,,

## 2022-12-02 RX ORDER — TESTOSTERONE CYPIONATE 200 MG/ML
200 INJECTION, SOLUTION INTRAMUSCULAR ONCE
Status: COMPLETED | OUTPATIENT
Start: 2022-12-02 | End: 2022-12-02

## 2022-12-02 RX ADMIN — TESTOSTERONE CYPIONATE 200 MG: 200 INJECTION, SOLUTION INTRAMUSCULAR at 09:12

## 2022-12-08 ENCOUNTER — TELEPHONE (OUTPATIENT)
Dept: UROLOGY | Facility: CLINIC | Age: 52
End: 2022-12-08
Payer: COMMERCIAL

## 2022-12-08 NOTE — TELEPHONE ENCOUNTER
Spoke with patient he states he woke up with fever will give his primary care doctor a call. Will not be able to make appointment this morning with . no soon appointments will you like to schedule patient for virtual?

## 2022-12-08 NOTE — TELEPHONE ENCOUNTER
----- Message from Clara Valdez sent at 12/8/2022  8:05 AM CST -----  .Type:  Patient Call Back    Who Called: PT       Does the patient know what this is regarding?: PT WOKE UP WITH A FEVER HE NEEDS TO RESCHEDULE 12/16/2022    Would the patient rather a call back YES     Best Call Back Number: [phone]    Additional Information: Thank You

## 2022-12-09 NOTE — TELEPHONE ENCOUNTER
Spoke with patient informed him of recommendations. Verbally voiced understanding. Appointment scheduled on 12/23.

## 2022-12-23 ENCOUNTER — OFFICE VISIT (OUTPATIENT)
Dept: UROLOGY | Facility: CLINIC | Age: 52
End: 2022-12-23
Payer: COMMERCIAL

## 2022-12-23 DIAGNOSIS — E29.1 HYPOGONADISM IN MALE: Primary | ICD-10-CM

## 2022-12-23 PROCEDURE — 1159F PR MEDICATION LIST DOCUMENTED IN MEDICAL RECORD: ICD-10-PCS | Mod: CPTII,95,, | Performed by: UROLOGY

## 2022-12-23 PROCEDURE — 4010F ACE/ARB THERAPY RXD/TAKEN: CPT | Mod: CPTII,95,, | Performed by: UROLOGY

## 2022-12-23 PROCEDURE — 1160F RVW MEDS BY RX/DR IN RCRD: CPT | Mod: CPTII,95,, | Performed by: UROLOGY

## 2022-12-23 PROCEDURE — 1159F MED LIST DOCD IN RCRD: CPT | Mod: CPTII,95,, | Performed by: UROLOGY

## 2022-12-23 PROCEDURE — 3044F PR MOST RECENT HEMOGLOBIN A1C LEVEL <7.0%: ICD-10-PCS | Mod: CPTII,95,, | Performed by: UROLOGY

## 2022-12-23 PROCEDURE — 1160F PR REVIEW ALL MEDS BY PRESCRIBER/CLIN PHARMACIST DOCUMENTED: ICD-10-PCS | Mod: CPTII,95,, | Performed by: UROLOGY

## 2022-12-23 PROCEDURE — 3044F HG A1C LEVEL LT 7.0%: CPT | Mod: CPTII,95,, | Performed by: UROLOGY

## 2022-12-23 PROCEDURE — 99214 OFFICE O/P EST MOD 30 MIN: CPT | Mod: 95,,, | Performed by: UROLOGY

## 2022-12-23 PROCEDURE — 4010F PR ACE/ARB THEARPY RXD/TAKEN: ICD-10-PCS | Mod: CPTII,95,, | Performed by: UROLOGY

## 2022-12-23 PROCEDURE — 99214 PR OFFICE/OUTPT VISIT, EST, LEVL IV, 30-39 MIN: ICD-10-PCS | Mod: 95,,, | Performed by: UROLOGY

## 2022-12-23 RX ORDER — TESTOSTERONE GEL, 1% 10 MG/G
5 GEL TRANSDERMAL DAILY
Qty: 30 PACKET | Refills: 3 | Status: SHIPPED | OUTPATIENT
Start: 2022-12-23 | End: 2023-01-24

## 2022-12-23 NOTE — Clinical Note
F/u in 4-7 weeks with cbc, psa and T prior May need preauth for T cream  Set up for T injeciton 200 next Friday nurse visit

## 2022-12-23 NOTE — PATIENT INSTRUCTIONS
Nancy Retana is a 52 y.o. male     Will stay with me bc of h/o of father with prostate cancer. Will need to monitor psa and REJI     1. Hypogonadism in male          Plan:       change to cream so he doesn't have to come every 2 weeks. 5g/daily. He doesn't want to do self injections. Would rather come in. Doesn't bother him that much.   May need prior authorization. He wants to come in for a shot until it's covered.  Can come in next Friday unless he gets cream before then.   May need to do phlebotomy. On asa 325mg already. H/h rising.   Cbc, psa and T in 4-6 weeks to see how he is doing on daily cream (assuming it's covered). If not tolerating, doesn't like, too expensive can see if aveed covered.     F/u in 4-7 weeks with cbc, psa and T prior

## 2022-12-23 NOTE — PROGRESS NOTES
The patient location is: [place]  Date of Service: 12/23/2022   The chief complaint leading to consultation is: see hpi and visit diagnosis  Visit type: Virtual visit with Real Time synchronous AUDIO and VIDEO   Doximity was not used today. Doximity used when having technical issues with [facility] virtual platform.   Time for visit: 35 minutes (including face to face virtually and time spent reviewing chart, speaking with consultants and instituting plan)  Each patient to whom he or she provides medical services by telemedicine is:    (1) informed of the relationship between the physician and patient and the respective role of any other health care provider with respect to management of the patient; and   (2) notified that he or she may decline to receive medical services by telemedicine and may withdraw from such care at any time.    [facility] Note - [place]  Staff: MD Petar  PCP: Evens Sandoval MD  Date of Service: 12/23/2022      Subjective:        HPI: Nancy Retana is a 52 y.o. male     He saw me in 2016 for low T and family hx of prostate cancer.   His father had prostate cancer in 50s or early 60s. He was treated with surgery. No recurrence. Pt had a psa in 2010 0.35 and 2016 0.61. none checked since and no problems urinating.  We also had a checked a T. T was 340 and he did T injections for about a year. He felt like it helped with fatigue and feels like he wants to try again. He never had a f/u after and hasn't been seen since. He can't remember stopping.  Only significant sx was fatigue.     Interval history since last visit with me:  Pt had a T, psa and h/h checked in January.   T was 174, psa was 0.6 and h/h 16.0/48.8.    HPI/CC today 7/5/22:   Fatigue still his main issue  Had T done but can't find labs.   No Ed. No problems urinating. Hasn't had a colonsocopy. Moved to [place]    Interval history 12/23/22:  Found to have low T twice. Last T was 2nd week of  December.   Started T every 2 weeks in august. Labs reviewed.   He feels like it's been helping. Has more energy.       Testosterone labs:   12/2/22 T 122 (done 2d before injection), psa 0.37, 17.0/50.5 (on asa 325mg)   10/6/22 T 642, psa 0.65, 16.1/47.1  7/15/22 T 169, psa 0.54, 15.6/45.2  1/5/22  T 174,   h/h 16.0/48.8  12/30/21  psa 0.6  11/4/16 T:340, H/H: 14.9/43.3, PSA: 0.61    Urine history:   1/3/22 neg    PSA History: father prostate cancer dx age late 50s treaed with prostatectomy    12/2/22 0.37  10/6/22 0.65  7/15/22 0.54  1/3/22  REJI: 25g gland without masses but firm on right,   12/30/21 0.6   11/4/16 0.61, REJI: 30g  3/29/10 0.35      REVIEW OF SYSTEMS:  Negative except for as stated above      Objective:     There were no vitals filed for this visit.    gu exam as above     Assessment:     Nancy Retana is a 52 y.o. male     Will stay with me bc of h/o of father with prostate cancer. Will need to monitor psa and REJI     1. Hypogonadism in male          Plan:       change to cream so he doesn't have to come every 2 weeks. 5g/daily. He doesn't want to do self injections. Would rather come in. Doesn't bother him that much.   May need prior authorization. He wants to come in for a shot until it's covered.  Can come in next Friday unless he gets cream before then.   May need to do phlebotomy. On asa 325mg already. H/h rising.   Cbc, psa and T in 4-6 weeks to see how he is doing on daily cream (assuming it's covered). If not tolerating, doesn't like, too expensive can see if aveed covered.     F/u in 4-7 weeks with cbc, psa and T prior    Opal Davey MD

## 2023-01-06 ENCOUNTER — CLINICAL SUPPORT (OUTPATIENT)
Dept: UROLOGY | Facility: CLINIC | Age: 53
End: 2023-01-06
Payer: COMMERCIAL

## 2023-01-06 DIAGNOSIS — E29.1 HYPOGONADISM IN MALE: Primary | ICD-10-CM

## 2023-01-06 PROCEDURE — 99499 UNLISTED E&M SERVICE: CPT | Mod: S$GLB,,, | Performed by: UROLOGY

## 2023-01-06 PROCEDURE — 96372 THER/PROPH/DIAG INJ SC/IM: CPT | Mod: PBBFAC,PN

## 2023-01-06 PROCEDURE — 99499 NO LOS: ICD-10-PCS | Mod: S$GLB,,, | Performed by: UROLOGY

## 2023-01-06 PROCEDURE — 96372 PR INJECTION,THERAP/PROPH/DIAG2ST, IM OR SUBCUT: ICD-10-PCS | Mod: S$GLB,,, | Performed by: UROLOGY

## 2023-01-06 PROCEDURE — 96372 THER/PROPH/DIAG INJ SC/IM: CPT | Mod: S$GLB,,, | Performed by: UROLOGY

## 2023-01-06 PROCEDURE — 99999 PR PBB SHADOW E&M-EST. PATIENT-LVL I: CPT | Mod: PBBFAC,,,

## 2023-01-06 PROCEDURE — 99999 PR PBB SHADOW E&M-EST. PATIENT-LVL I: ICD-10-PCS | Mod: PBBFAC,,,

## 2023-01-06 RX ORDER — TESTOSTERONE CYPIONATE 200 MG/ML
200 INJECTION, SOLUTION INTRAMUSCULAR ONCE
Status: COMPLETED | OUTPATIENT
Start: 2023-01-06 | End: 2023-01-06

## 2023-01-06 RX ADMIN — TESTOSTERONE CYPIONATE 200 MG: 200 INJECTION, SOLUTION INTRAMUSCULAR at 09:01

## 2023-01-10 ENCOUNTER — TELEPHONE (OUTPATIENT)
Dept: UROLOGY | Facility: CLINIC | Age: 53
End: 2023-01-10
Payer: COMMERCIAL

## 2023-01-24 ENCOUNTER — HOSPITAL ENCOUNTER (INPATIENT)
Facility: HOSPITAL | Age: 53
LOS: 2 days | Discharge: HOME | DRG: 392 | End: 2023-01-26
Attending: EMERGENCY MEDICINE | Admitting: FAMILY MEDICINE
Payer: COMMERCIAL

## 2023-01-24 ENCOUNTER — TELEPHONE (OUTPATIENT)
Dept: UROLOGY | Facility: CLINIC | Age: 53
End: 2023-01-24

## 2023-01-24 ENCOUNTER — HOSPITAL ENCOUNTER (OUTPATIENT)
Dept: RADIOLOGY | Facility: HOSPITAL | Age: 53
Discharge: HOME | End: 2023-01-24
Attending: UROLOGY
Payer: COMMERCIAL

## 2023-01-24 ENCOUNTER — TELEPHONE (OUTPATIENT)
Dept: UROLOGY | Facility: CLINIC | Age: 53
End: 2023-01-24
Payer: COMMERCIAL

## 2023-01-24 ENCOUNTER — OFFICE VISIT (OUTPATIENT)
Dept: UROLOGY | Facility: CLINIC | Age: 53
End: 2023-01-24
Payer: COMMERCIAL

## 2023-01-24 VITALS
BODY MASS INDEX: 36.29 KG/M2 | HEART RATE: 106 BPM | SYSTOLIC BLOOD PRESSURE: 141 MMHG | DIASTOLIC BLOOD PRESSURE: 91 MMHG | WEIGHT: 253.5 LBS | TEMPERATURE: 101 F | HEIGHT: 70 IN

## 2023-01-24 DIAGNOSIS — R10.9 LEFT FLANK PAIN: ICD-10-CM

## 2023-01-24 DIAGNOSIS — K57.20 DIVERTICULITIS OF COLON WITH PERFORATION: Primary | ICD-10-CM

## 2023-01-24 DIAGNOSIS — R07.9 CHEST PAIN: ICD-10-CM

## 2023-01-24 DIAGNOSIS — E29.1 HYPOGONADISM IN MALE: ICD-10-CM

## 2023-01-24 DIAGNOSIS — R10.9 LEFT FLANK PAIN: Primary | ICD-10-CM

## 2023-01-24 PROBLEM — K57.92 DIVERTICULITIS: Status: ACTIVE | Noted: 2023-01-24

## 2023-01-24 PROBLEM — Z86.79 HISTORY OF ATRIAL FIBRILLATION: Status: ACTIVE | Noted: 2023-01-24

## 2023-01-24 LAB
ALBUMIN SERPL BCP-MCNC: 4.3 G/DL (ref 3.5–5.2)
ALP SERPL-CCNC: 52 U/L (ref 55–135)
ALT SERPL W/O P-5'-P-CCNC: 19 U/L (ref 10–44)
ANION GAP SERPL CALC-SCNC: 14 MMOL/L (ref 8–16)
AST SERPL-CCNC: 14 U/L (ref 10–40)
BASOPHILS # BLD AUTO: 0.04 K/UL (ref 0–0.2)
BASOPHILS NFR BLD: 0.3 % (ref 0–1.9)
BILIRUB SERPL-MCNC: 1.2 MG/DL (ref 0.1–1)
BILIRUB UR QL STRIP: NEGATIVE
BILIRUBIN, UA POC OHS: NEGATIVE
BLOOD, UA POC OHS: NEGATIVE
BUN SERPL-MCNC: 17 MG/DL (ref 6–20)
CALCIUM SERPL-MCNC: 9.5 MG/DL (ref 8.7–10.5)
CHLORIDE SERPL-SCNC: 98 MMOL/L (ref 95–110)
CLARITY UR: CLEAR
CLARITY, UA POC OHS: CLEAR
CO2 SERPL-SCNC: 24 MMOL/L (ref 23–29)
COLOR UR: YELLOW
COLOR, UA POC OHS: YELLOW
CREAT SERPL-MCNC: 1 MG/DL (ref 0.5–1.4)
DIFFERENTIAL METHOD: ABNORMAL
EOSINOPHIL # BLD AUTO: 0 K/UL (ref 0–0.5)
EOSINOPHIL NFR BLD: 0.3 % (ref 0–8)
ERYTHROCYTE [DISTWIDTH] IN BLOOD BY AUTOMATED COUNT: 13.1 % (ref 11.5–14.5)
EST. GFR  (NO RACE VARIABLE): >60 ML/MIN/1.73 M^2
GLUCOSE SERPL-MCNC: 93 MG/DL (ref 70–110)
GLUCOSE UR QL STRIP: NEGATIVE
GLUCOSE, UA POC OHS: NEGATIVE
HCT VFR BLD AUTO: 40.7 % (ref 40–54)
HGB BLD-MCNC: 13.9 G/DL (ref 14–18)
HGB UR QL STRIP: NEGATIVE
IMM GRANULOCYTES # BLD AUTO: 0.04 K/UL (ref 0–0.04)
IMM GRANULOCYTES NFR BLD AUTO: 0.3 % (ref 0–0.5)
KETONES UR QL STRIP: NEGATIVE
KETONES, UA POC OHS: NEGATIVE
LACTATE SERPL-SCNC: 0.8 MMOL/L (ref 0.5–1.9)
LEUKOCYTE ESTERASE UR QL STRIP: NEGATIVE
LEUKOCYTES, UA POC OHS: NEGATIVE
LIPASE SERPL-CCNC: 30 U/L (ref 4–60)
LYMPHOCYTES # BLD AUTO: 3 K/UL (ref 1–4.8)
LYMPHOCYTES NFR BLD: 23.7 % (ref 18–48)
MCH RBC QN AUTO: 31.2 PG (ref 27–31)
MCHC RBC AUTO-ENTMCNC: 34.2 G/DL (ref 32–36)
MCV RBC AUTO: 91 FL (ref 82–98)
MONOCYTES # BLD AUTO: 1.2 K/UL (ref 0.3–1)
MONOCYTES NFR BLD: 9.3 % (ref 4–15)
NEUTROPHILS # BLD AUTO: 8.5 K/UL (ref 1.8–7.7)
NEUTROPHILS NFR BLD: 66.1 % (ref 38–73)
NITRITE UR QL STRIP: NEGATIVE
NITRITE, UA POC OHS: NEGATIVE
NRBC BLD-RTO: 0 /100 WBC
PH UR STRIP: 6 [PH] (ref 5–8)
PH, UA POC OHS: 6
PLATELET # BLD AUTO: 218 K/UL (ref 150–450)
PMV BLD AUTO: 9.3 FL (ref 9.2–12.9)
POTASSIUM SERPL-SCNC: 3.4 MMOL/L (ref 3.5–5.1)
PROT SERPL-MCNC: 8.3 G/DL (ref 6–8.4)
PROT UR QL STRIP: ABNORMAL
PROTEIN, UA POC OHS: NEGATIVE
RBC # BLD AUTO: 4.46 M/UL (ref 4.6–6.2)
SODIUM SERPL-SCNC: 136 MMOL/L (ref 136–145)
SP GR UR STRIP: 1.02 (ref 1–1.03)
SPECIFIC GRAVITY, UA POC OHS: 1.01
URN SPEC COLLECT METH UR: ABNORMAL
UROBILINOGEN UR STRIP-ACNC: NEGATIVE EU/DL
UROBILINOGEN, UA POC OHS: 0.2
WBC # BLD AUTO: 12.83 K/UL (ref 3.9–12.7)

## 2023-01-24 PROCEDURE — 96372 THER/PROPH/DIAG INJ SC/IM: CPT | Mod: S$GLB,,, | Performed by: UROLOGY

## 2023-01-24 PROCEDURE — 1159F MED LIST DOCD IN RCRD: CPT | Mod: CPTII,S$GLB,, | Performed by: UROLOGY

## 2023-01-24 PROCEDURE — 87040 BLOOD CULTURE FOR BACTERIA: CPT | Mod: 59 | Performed by: EMERGENCY MEDICINE

## 2023-01-24 PROCEDURE — 3080F PR MOST RECENT DIASTOLIC BLOOD PRESSURE >= 90 MM HG: ICD-10-PCS | Mod: CPTII,S$GLB,, | Performed by: UROLOGY

## 2023-01-24 PROCEDURE — 99999 PR PBB SHADOW E&M-EST. PATIENT-LVL IV: CPT | Mod: PBBFAC,,, | Performed by: UROLOGY

## 2023-01-24 PROCEDURE — 1159F PR MEDICATION LIST DOCUMENTED IN MEDICAL RECORD: ICD-10-PCS | Mod: CPTII,S$GLB,, | Performed by: UROLOGY

## 2023-01-24 PROCEDURE — 3080F DIAST BP >= 90 MM HG: CPT | Mod: CPTII,S$GLB,, | Performed by: UROLOGY

## 2023-01-24 PROCEDURE — 3008F BODY MASS INDEX DOCD: CPT | Mod: CPTII,S$GLB,, | Performed by: UROLOGY

## 2023-01-24 PROCEDURE — 3077F SYST BP >= 140 MM HG: CPT | Mod: CPTII,S$GLB,, | Performed by: UROLOGY

## 2023-01-24 PROCEDURE — 3008F PR BODY MASS INDEX (BMI) DOCUMENTED: ICD-10-PCS | Mod: CPTII,S$GLB,, | Performed by: UROLOGY

## 2023-01-24 PROCEDURE — 81003 URINALYSIS AUTO W/O SCOPE: CPT | Performed by: EMERGENCY MEDICINE

## 2023-01-24 PROCEDURE — 99214 PR OFFICE/OUTPT VISIT, EST, LEVL IV, 30-39 MIN: ICD-10-PCS | Mod: 25,S$GLB,, | Performed by: UROLOGY

## 2023-01-24 PROCEDURE — 85025 COMPLETE CBC W/AUTO DIFF WBC: CPT | Mod: 91 | Performed by: EMERGENCY MEDICINE

## 2023-01-24 PROCEDURE — 99214 OFFICE O/P EST MOD 30 MIN: CPT | Mod: 25,S$GLB,, | Performed by: UROLOGY

## 2023-01-24 PROCEDURE — 74176 CT ABD & PELVIS W/O CONTRAST: CPT | Mod: 26,,, | Performed by: RADIOLOGY

## 2023-01-24 PROCEDURE — 99285 EMERGENCY DEPT VISIT HI MDM: CPT

## 2023-01-24 PROCEDURE — 1160F PR REVIEW ALL MEDS BY PRESCRIBER/CLIN PHARMACIST DOCUMENTED: ICD-10-PCS | Mod: CPTII,S$GLB,, | Performed by: UROLOGY

## 2023-01-24 PROCEDURE — 81003 POCT URINALYSIS(INSTRUMENT): ICD-10-PCS | Mod: QW,S$GLB,, | Performed by: UROLOGY

## 2023-01-24 PROCEDURE — 96372 PR INJECTION,THERAP/PROPH/DIAG2ST, IM OR SUBCUT: ICD-10-PCS | Mod: S$GLB,,, | Performed by: UROLOGY

## 2023-01-24 PROCEDURE — 63600175 PHARM REV CODE 636 W HCPCS: Performed by: EMERGENCY MEDICINE

## 2023-01-24 PROCEDURE — 3077F PR MOST RECENT SYSTOLIC BLOOD PRESSURE >= 140 MM HG: ICD-10-PCS | Mod: CPTII,S$GLB,, | Performed by: UROLOGY

## 2023-01-24 PROCEDURE — 1160F RVW MEDS BY RX/DR IN RCRD: CPT | Mod: CPTII,S$GLB,, | Performed by: UROLOGY

## 2023-01-24 PROCEDURE — 74176 CT RENAL STONE STUDY ABD PELVIS WO: ICD-10-PCS | Mod: 26,,, | Performed by: RADIOLOGY

## 2023-01-24 PROCEDURE — 12000002 HC ACUTE/MED SURGE SEMI-PRIVATE ROOM

## 2023-01-24 PROCEDURE — 80053 COMPREHEN METABOLIC PANEL: CPT | Mod: 91 | Performed by: EMERGENCY MEDICINE

## 2023-01-24 PROCEDURE — 99999 PR PBB SHADOW E&M-EST. PATIENT-LVL IV: ICD-10-PCS | Mod: PBBFAC,,, | Performed by: UROLOGY

## 2023-01-24 PROCEDURE — 25000003 PHARM REV CODE 250: Performed by: NURSE PRACTITIONER

## 2023-01-24 PROCEDURE — 74176 CT ABD & PELVIS W/O CONTRAST: CPT | Mod: TC

## 2023-01-24 PROCEDURE — 81003 URINALYSIS AUTO W/O SCOPE: CPT | Mod: QW,S$GLB,, | Performed by: UROLOGY

## 2023-01-24 PROCEDURE — 83605 ASSAY OF LACTIC ACID: CPT | Performed by: EMERGENCY MEDICINE

## 2023-01-24 PROCEDURE — 83690 ASSAY OF LIPASE: CPT | Performed by: EMERGENCY MEDICINE

## 2023-01-24 RX ORDER — SODIUM CHLORIDE 0.9 % (FLUSH) 0.9 %
10 SYRINGE (ML) INJECTION
Status: DISCONTINUED | OUTPATIENT
Start: 2023-01-24 | End: 2023-01-26 | Stop reason: HOSPADM

## 2023-01-24 RX ORDER — LOSARTAN POTASSIUM 25 MG/1
25 TABLET ORAL NIGHTLY
Status: DISCONTINUED | OUTPATIENT
Start: 2023-01-24 | End: 2023-01-26 | Stop reason: HOSPADM

## 2023-01-24 RX ORDER — CEFTRIAXONE 1 G/1
1 INJECTION, POWDER, FOR SOLUTION INTRAMUSCULAR; INTRAVENOUS
Status: DISCONTINUED | OUTPATIENT
Start: 2023-01-24 | End: 2023-01-24 | Stop reason: HOSPADM

## 2023-01-24 RX ORDER — NAPROXEN SODIUM 220 MG/1
81 TABLET, FILM COATED ORAL NIGHTLY
Status: DISCONTINUED | OUTPATIENT
Start: 2023-01-24 | End: 2023-01-24

## 2023-01-24 RX ORDER — ONDANSETRON 2 MG/ML
4 INJECTION INTRAMUSCULAR; INTRAVENOUS EVERY 6 HOURS PRN
Status: DISCONTINUED | OUTPATIENT
Start: 2023-01-24 | End: 2023-01-26 | Stop reason: HOSPADM

## 2023-01-24 RX ORDER — HYDROCODONE BITARTRATE AND ACETAMINOPHEN 5; 325 MG/1; MG/1
1 TABLET ORAL EVERY 4 HOURS PRN
Status: DISCONTINUED | OUTPATIENT
Start: 2023-01-24 | End: 2023-01-26 | Stop reason: HOSPADM

## 2023-01-24 RX ORDER — SODIUM CHLORIDE 9 MG/ML
INJECTION, SOLUTION INTRAVENOUS CONTINUOUS
Status: DISCONTINUED | OUTPATIENT
Start: 2023-01-24 | End: 2023-01-26 | Stop reason: HOSPADM

## 2023-01-24 RX ORDER — METOPROLOL SUCCINATE 25 MG/1
25 TABLET, EXTENDED RELEASE ORAL NIGHTLY
Status: DISCONTINUED | OUTPATIENT
Start: 2023-01-24 | End: 2023-01-26 | Stop reason: HOSPADM

## 2023-01-24 RX ORDER — SIMETHICONE 80 MG
1 TABLET,CHEWABLE ORAL 4 TIMES DAILY PRN
Status: DISCONTINUED | OUTPATIENT
Start: 2023-01-24 | End: 2023-01-26 | Stop reason: HOSPADM

## 2023-01-24 RX ORDER — PROCHLORPERAZINE EDISYLATE 5 MG/ML
5 INJECTION INTRAMUSCULAR; INTRAVENOUS EVERY 6 HOURS PRN
Status: DISCONTINUED | OUTPATIENT
Start: 2023-01-24 | End: 2023-01-26 | Stop reason: HOSPADM

## 2023-01-24 RX ORDER — ACETAMINOPHEN 325 MG/1
650 TABLET ORAL EVERY 4 HOURS PRN
Status: DISCONTINUED | OUTPATIENT
Start: 2023-01-24 | End: 2023-01-26 | Stop reason: HOSPADM

## 2023-01-24 RX ORDER — MORPHINE SULFATE 4 MG/ML
4 INJECTION, SOLUTION INTRAMUSCULAR; INTRAVENOUS EVERY 4 HOURS PRN
Status: DISCONTINUED | OUTPATIENT
Start: 2023-01-24 | End: 2023-01-26 | Stop reason: HOSPADM

## 2023-01-24 RX ORDER — NALOXONE HCL 0.4 MG/ML
0.02 VIAL (ML) INJECTION
Status: DISCONTINUED | OUTPATIENT
Start: 2023-01-24 | End: 2023-01-26 | Stop reason: HOSPADM

## 2023-01-24 RX ORDER — TALC
6 POWDER (GRAM) TOPICAL NIGHTLY PRN
Status: DISCONTINUED | OUTPATIENT
Start: 2023-01-24 | End: 2023-01-26 | Stop reason: HOSPADM

## 2023-01-24 RX ADMIN — SODIUM CHLORIDE: 0.9 INJECTION, SOLUTION INTRAVENOUS at 09:01

## 2023-01-24 RX ADMIN — LOSARTAN POTASSIUM 25 MG: 25 TABLET, FILM COATED ORAL at 09:01

## 2023-01-24 RX ADMIN — PIPERACILLIN SODIUM AND TAZOBACTAM SODIUM 4.5 G: 4; .5 INJECTION, POWDER, LYOPHILIZED, FOR SOLUTION INTRAVENOUS at 07:01

## 2023-01-24 RX ADMIN — SODIUM CHLORIDE, SODIUM LACTATE, POTASSIUM CHLORIDE, AND CALCIUM CHLORIDE 1000 ML: .6; .31; .03; .02 INJECTION, SOLUTION INTRAVENOUS at 07:01

## 2023-01-24 RX ADMIN — CEFTRIAXONE 1 G: 1 INJECTION, POWDER, FOR SOLUTION INTRAMUSCULAR; INTRAVENOUS at 03:01

## 2023-01-24 RX ADMIN — METOPROLOL SUCCINATE 25 MG: 25 TABLET, FILM COATED, EXTENDED RELEASE ORAL at 09:01

## 2023-01-24 NOTE — TELEPHONE ENCOUNTER
Pt's ctap shows microperforation  Has fever, needs to go to ER for admit   Going to [facility] now ER  Spoke with dr oleary    Recent Labs   Lab 10/06/22  0759 12/02/22  0900 01/24/23  1622   WBC 8.88  8.88 8.34 13.91 H   Hemoglobin 16.1  16.1 17.0 14.7   Hematocrit 47.1  47.1 50.5 42.9   Platelets 218  218 201 216   ]  Recent Labs   Lab 02/18/20  0047 08/16/22  0848 01/24/23  1622   Sodium 140 140 135 L   Potassium 3.9 4.6 3.8   Chloride 105 103 100   CO2 25 26 22 L   BUN 12 14 14   Creatinine 0.98 1.2 1.2   Glucose  --  106 103   Calcium 9.8 9.9 9.6   Alkaline Phosphatase  --  50 L 55   Alk Phos 70  --   --    Total Protein  --  7.3 8.0   Albumin  --  4.6 4.1   Albumin Level 4.7  --   --    Total Bilirubin  --  0.6 1.0   AST  --  22 13   Aspartate Aminotransferase 48 H  --   --    ALT  --  32 19   Alanine Aminotransferase 108 H  --   --    ]    Lab Results   Component Value Date    HGBA1C 5.5 08/16/2022

## 2023-01-24 NOTE — TELEPHONE ENCOUNTER
----- Message from Cayla Montoya, Patient Care Assistant sent at 1/24/2023  7:56 AM CST -----  Regarding: advice  Contact: pt  Type: Needs Medical Advice  Who Called:  pt     Symptoms (please be specific):  kidney stones     How long has patient had these symptoms:  1 day     Best Call Back Number: [phone] (home) [phone] (work)    Additional Information: please call pt to advise. Thanks!

## 2023-01-24 NOTE — TELEPHONE ENCOUNTER
Spoke with patient thinks he may have a kidney stone. Appointment scheduled today with . patient verbally voiced understanding.

## 2023-01-24 NOTE — PROGRESS NOTES
Per  patient received Rocephin 1G Im in right gluteal. Patient tolerated well no adverse reaction noted.

## 2023-01-24 NOTE — PROGRESS NOTES
[facility] Note - [place]  Staff: MD Petar  PCP: Evens Sandoval MD  Date of Service: 01/24/2023      Subjective:        HPI: Nancy Retana is a 52 y.o. male     He saw me in 2016 for low T and family hx of prostate cancer.   His father had prostate cancer in 50s or early 60s. He was treated with surgery. No recurrence. Pt had a psa in 2010 0.35 and 2016 0.61. none checked since and no problems urinating.  We also had a checked a T. T was 340 and he did T injections for about a year. He felt like it helped with fatigue and feels like he wants to try again. He never had a f/u after and hasn't been seen since. He can't remember stopping.  Only significant sx was fatigue.     Interval history since last visit with me:  Pt had a T, psa and h/h checked in January.   T was 174, psa was 0.6 and h/h 16.0/48.8.    HPI/CC today 7/5/22:   Fatigue still his main issue  Had T done but can't find labs.   No Ed. No problems urinating. Hasn't had a colonsocopy. Moved to [place]    Interval history 12/23/22:  Found to have low T twice. Last T was 2nd week of December.   Started T every 2 weeks in august. Labs reviewed.   He feels like it's been helping. Has more energy.     Interval history 1/24/23:  At last visit we were going to change him testosterone cream but it was not covered.  So now he is just getting shots every 2 weeks.  We plan to order labs and that is originally why he was here however  Donated blood 2 weeks ago and plans to donate blood again on February 25th  Started having left flank pain last night.  Nothing to prompt the pain.  He is tender on an muscle.  He had history of kidney stones in his teens.  Last imaging was not L-spine x-ray which does image the kidneys and no obvious stones on those.  His urine today shows no blood or infection.  Does however have a temperature of 100.6°.  Pain is constant.  10/10.  Never has had pain like this before.  Denies any gross  hematuria.  No one in his family with kidney stones.  No cough no cold no sick contacts.  .      Testosterone labs:   12/2/22 T 122 (done 2d before injection), psa 0.37, 17.0/50.5 (on asa 325mg)   10/6/22 T 642, psa 0.65, 16.1/47.1  7/15/22 T 169, psa 0.54, 15.6/45.2  1/5/22  T 174,   h/h 16.0/48.8  12/30/21  psa 0.6  11/4/16 T:340, H/H: 14.9/43.3, PSA: 0.61    Urine history: on asa 325mg  1/24/23 neg  1/3/22  neg    PSA History: father prostate cancer dx age late 50s treaed with prostatectomy    12/2/22 0.37  10/6/22 0.65  7/15/22 0.54  1/3/22  REJI: 25g gland without masses but firm on right,   12/30/21 0.6   11/4/16 0.61, REJI: 30g  3/29/10 0.35      REVIEW OF SYSTEMS:  Negative except for as stated above      Objective:     Vitals:    01/24/23 1452   BP: (!) 141/91   Pulse: 106   Temp: (!) 100.6 °F (38.1 °C)       gu exam as above     Assessment:     Nancy Retana is a 52 y.o. male     Will stay with me bc of h/o of father with prostate cancer. Will need to monitor psa and REJI     1. Left flank pain    2. Hypogonadism in male          Plan:     Sent for stat CT renal stone study, CBC and BMP.  If he has a kidney stone will need to go to ER for admit and possible stent tonight. Notified dr. Viveros  On aspirin 325 mg daily    Rocephin 1 g IM x1 now    If no stone seen should go to urgent care or ER if no obvious source found especially if pain severe and having fever.    And then continue 100 mg testosterone q.2 weeks.  Doing shots here.  CBC, PSA and testosterone in 6 months and follow-up after, donated blood a few weeks ago and donating again on February 25th.  H&H was increasing.  We will see what his CBC shows since he just gave blood a few weeks ago.    Opal Davey MD

## 2023-01-24 NOTE — PATIENT INSTRUCTIONS
1. Left flank pain    2. Hypogonadism in male          Plan:     Sent for stat CT renal stone study, CBC and BMP.  If he has a kidney stone will need to go to ER for admit and possible stent tonight. Notified dr. Viveros  On aspirin 325 mg daily    Rocephin 1 g IM x1 now    If no stone seen should go to urgent care or ER if no obvious source found especially if pain severe and having fever.    And then continue 100 mg testosterone q.2 weeks.  Doing shots here.  CBC, PSA and testosterone in 6 months and follow-up after, donated blood a few weeks ago and donating again on February 25th.  H&H was increasing.  We will see what his CBC shows since he just gave blood a few weeks ago.

## 2023-01-24 NOTE — TELEPHONE ENCOUNTER
----- Message from Ivet Alcantara sent at 1/24/2023  4:25 PM CST -----  Who Called: Wife    What is the request in detail: Requesting call back to inform Minnie that her  has completed the ordered CT and labs. Please advise.     Can the clinic reply by [facility]? No    Best Call Back Number: [phone]      Additional Information: Patient is also asking if he is stay at the lab. Until his doctor reaches out to him.

## 2023-01-25 PROBLEM — K57.20 DIVERTICULITIS OF COLON WITH PERFORATION: Status: ACTIVE | Noted: 2023-01-25

## 2023-01-25 LAB
ANION GAP SERPL CALC-SCNC: 11 MMOL/L (ref 8–16)
BASOPHILS # BLD AUTO: 0.03 K/UL (ref 0–0.2)
BASOPHILS NFR BLD: 0.3 % (ref 0–1.9)
BUN SERPL-MCNC: 15 MG/DL (ref 6–20)
CALCIUM SERPL-MCNC: 8.9 MG/DL (ref 8.7–10.5)
CHLORIDE SERPL-SCNC: 102 MMOL/L (ref 95–110)
CO2 SERPL-SCNC: 24 MMOL/L (ref 23–29)
CREAT SERPL-MCNC: 1 MG/DL (ref 0.5–1.4)
DIFFERENTIAL METHOD: ABNORMAL
EOSINOPHIL # BLD AUTO: 0 K/UL (ref 0–0.5)
EOSINOPHIL NFR BLD: 0.4 % (ref 0–8)
ERYTHROCYTE [DISTWIDTH] IN BLOOD BY AUTOMATED COUNT: 13.1 % (ref 11.5–14.5)
EST. GFR  (NO RACE VARIABLE): >60 ML/MIN/1.73 M^2
ESTIMATED AVG GLUCOSE: 123 MG/DL (ref 68–131)
GLUCOSE SERPL-MCNC: 100 MG/DL (ref 70–110)
HBA1C MFR BLD: 5.9 % (ref 4.5–6.2)
HCT VFR BLD AUTO: 40.8 % (ref 40–54)
HGB BLD-MCNC: 13.6 G/DL (ref 14–18)
IMM GRANULOCYTES # BLD AUTO: 0.04 K/UL (ref 0–0.04)
IMM GRANULOCYTES NFR BLD AUTO: 0.4 % (ref 0–0.5)
LYMPHOCYTES # BLD AUTO: 2.3 K/UL (ref 1–4.8)
LYMPHOCYTES NFR BLD: 24.2 % (ref 18–48)
MAGNESIUM SERPL-MCNC: 2 MG/DL (ref 1.6–2.6)
MCH RBC QN AUTO: 31.2 PG (ref 27–31)
MCHC RBC AUTO-ENTMCNC: 33.3 G/DL (ref 32–36)
MCV RBC AUTO: 94 FL (ref 82–98)
MONOCYTES # BLD AUTO: 0.9 K/UL (ref 0.3–1)
MONOCYTES NFR BLD: 9.8 % (ref 4–15)
NEUTROPHILS # BLD AUTO: 6.2 K/UL (ref 1.8–7.7)
NEUTROPHILS NFR BLD: 64.9 % (ref 38–73)
NRBC BLD-RTO: 0 /100 WBC
PLATELET # BLD AUTO: 198 K/UL (ref 150–450)
PMV BLD AUTO: 9.3 FL (ref 9.2–12.9)
POTASSIUM SERPL-SCNC: 3.7 MMOL/L (ref 3.5–5.1)
RBC # BLD AUTO: 4.36 M/UL (ref 4.6–6.2)
SODIUM SERPL-SCNC: 137 MMOL/L (ref 136–145)
WBC # BLD AUTO: 9.48 K/UL (ref 3.9–12.7)

## 2023-01-25 PROCEDURE — 85025 COMPLETE CBC W/AUTO DIFF WBC: CPT | Performed by: NURSE PRACTITIONER

## 2023-01-25 PROCEDURE — 25000003 PHARM REV CODE 250: Performed by: NURSE PRACTITIONER

## 2023-01-25 PROCEDURE — 12000002 HC ACUTE/MED SURGE SEMI-PRIVATE ROOM

## 2023-01-25 PROCEDURE — 80048 BASIC METABOLIC PNL TOTAL CA: CPT | Performed by: NURSE PRACTITIONER

## 2023-01-25 PROCEDURE — 83036 HEMOGLOBIN GLYCOSYLATED A1C: CPT | Performed by: NURSE PRACTITIONER

## 2023-01-25 PROCEDURE — 36415 COLL VENOUS BLD VENIPUNCTURE: CPT | Performed by: NURSE PRACTITIONER

## 2023-01-25 PROCEDURE — 99223 1ST HOSP IP/OBS HIGH 75: CPT | Mod: ,,, | Performed by: SURGERY

## 2023-01-25 PROCEDURE — 25000003 PHARM REV CODE 250: Performed by: INTERNAL MEDICINE

## 2023-01-25 PROCEDURE — 63600175 PHARM REV CODE 636 W HCPCS: Performed by: NURSE PRACTITIONER

## 2023-01-25 PROCEDURE — 99223 PR INITIAL HOSPITAL CARE,LEVL III: ICD-10-PCS | Mod: ,,, | Performed by: SURGERY

## 2023-01-25 PROCEDURE — 83735 ASSAY OF MAGNESIUM: CPT | Performed by: NURSE PRACTITIONER

## 2023-01-25 RX ORDER — POTASSIUM CHLORIDE 750 MG/1
10 CAPSULE, EXTENDED RELEASE ORAL 2 TIMES DAILY
Status: DISCONTINUED | OUTPATIENT
Start: 2023-01-25 | End: 2023-01-26 | Stop reason: HOSPADM

## 2023-01-25 RX ORDER — LOSARTAN POTASSIUM 25 MG/1
25 TABLET ORAL DAILY
COMMUNITY

## 2023-01-25 RX ORDER — POTASSIUM CHLORIDE 20 MEQ/1
20 TABLET, EXTENDED RELEASE ORAL 2 TIMES DAILY
Status: CANCELLED | OUTPATIENT
Start: 2023-01-25

## 2023-01-25 RX ADMIN — METOPROLOL SUCCINATE 25 MG: 25 TABLET, FILM COATED, EXTENDED RELEASE ORAL at 08:01

## 2023-01-25 RX ADMIN — POTASSIUM CHLORIDE 10 MEQ: 750 CAPSULE, EXTENDED RELEASE ORAL at 08:01

## 2023-01-25 RX ADMIN — PIPERACILLIN SODIUM AND TAZOBACTAM SODIUM 3.38 G: 3; .375 INJECTION, POWDER, LYOPHILIZED, FOR SOLUTION INTRAVENOUS at 11:01

## 2023-01-25 RX ADMIN — PIPERACILLIN SODIUM AND TAZOBACTAM SODIUM 3.38 G: 3; .375 INJECTION, POWDER, LYOPHILIZED, FOR SOLUTION INTRAVENOUS at 06:01

## 2023-01-25 RX ADMIN — MORPHINE SULFATE 4 MG: 4 INJECTION, SOLUTION INTRAMUSCULAR; INTRAVENOUS at 01:01

## 2023-01-25 RX ADMIN — POTASSIUM CHLORIDE 10 MEQ: 750 CAPSULE, EXTENDED RELEASE ORAL at 11:01

## 2023-01-25 RX ADMIN — PIPERACILLIN SODIUM AND TAZOBACTAM SODIUM 3.38 G: 3; .375 INJECTION, POWDER, LYOPHILIZED, FOR SOLUTION INTRAVENOUS at 04:01

## 2023-01-25 RX ADMIN — LOSARTAN POTASSIUM 25 MG: 25 TABLET, FILM COATED ORAL at 08:01

## 2023-01-25 NOTE — PLAN OF CARE
Critical access hospital  Initial Discharge Assessment       Primary Care Provider: Evens Sandoval MD    Admission Diagnosis: Diverticulitis of colon with perforation [K57.20]    Admission Date: 1/24/2023  Expected Discharge Date: 1/27/2023    Discharge Barriers Identified: None    Payor: BLUE CROSS [employer] / Plan: BCBS ALL OUT OF STATE / Product Type: PPO /     Extended Emergency Contact Information  Primary Emergency Contact: Mallika Retana  Address: [address] [place] [address] [id number] [place] [address]  Home Phone: [phone]  Relation: Spouse  Preferred language: English   needed? No    Discharge Plan A: Home with family  Discharge Plan B: Home with family      [id number] - [place], [place] - [address]  [address]  [facility] [address]  Phone: [phone] Fax: [phone]    [id number] [address] [facility]  [address]  [facility] [address]  Phone: [phone] Fax: [phone]      Initial Assessment (most recent)       Adult Discharge Assessment - 01/25/23 1400          Discharge Assessment    Assessment Type Discharge Planning Assessment     Confirmed/corrected address, phone number and insurance Yes     Confirmed Demographics Correct on Facesheet     Source of Information patient     Communicated NEFTALY with patient/caregiver Date not available/Unable to determine     People in Home spouse     Do you expect to return to your current living situation? Yes     Do you have help at home or someone to help you manage your care at home? Yes     Who are your caregiver(s) and their phone number(s)? Wife/Mallika [phone]     Prior to hospitilization cognitive status: Alert/Oriented     Current cognitive status: Alert/Oriented     Home Accessibility wheelchair accessible     Home Layout Able to live on 1st floor     Equipment Currently Used at Home none     Readmission within 30 days?  No     Patient currently being followed by outpatient case management? No     Do you currently have service(s) that help you manage your care at home? No     Do you take prescription medications? Yes     Do you have prescription coverage? Yes     Coverage BCBS     Do you have any problems affording any of your prescribed medications? No     Is the patient taking medications as prescribed? yes     Who is going to help you get home at discharge? Wife/Mallika [phone]     How do you get to doctors appointments? car, drives self     Are you on dialysis? No     Do you take coumadin? No     Discharge Plan A Home with family     Discharge Plan B Home with family     DME Needed Upon Discharge  none     Discharge Plan discussed with: Patient     Discharge Barriers Identified None        OTHER    Name(s) of People in Home Wife/Mallika [phone]

## 2023-01-25 NOTE — NURSING
Patient arrived to floor, room 1115, with diagnosis of diverticulitis. Patient alert and oriented x4, denies any pain or N/V at this time. Discussed plan of care with patient. Patient verbalized an understanding.

## 2023-01-25 NOTE — SUBJECTIVE & OBJECTIVE
Past Medical History:   Diagnosis Date    Hypertension        Past Surgical History:   Procedure Laterality Date    CARDIAC ELECTROPHYSIOLOGY STUDY AND ABLATION      TONSILLECTOMY, ADENOIDECTOMY      VASECTOMY         Review of patient's allergies indicates:  No Known Allergies    Current Facility-Administered Medications on File Prior to Encounter   Medication    [COMPLETED] cefTRIAXone injection 1 g     Current Outpatient Medications on File Prior to Encounter   Medication Sig    aspirin 81 mg Cap Take 81 mg by mouth.    KRILL OIL ORAL Take 1 capsule by mouth once daily.    metoprolol succinate (TOPROL-XL) 25 MG 24 hr tablet Take 1 tablet (25 mg total) by mouth once daily.    multivitamin capsule Take 1 capsule by mouth once daily.    olmesartan-hydrochlorothiazide (BENICAR HCT) 20-12.5 mg per tablet Take 1 tablet by mouth once daily. (Patient taking differently: Take 1 tablet by mouth once daily. NEW Rx - NOT YET STARTED)    potassium chloride SA (K-DUR,KLOR-CON M) 10 MEQ tablet Take 2 tablets (20 mEq total) by mouth 2 (two) times daily.    RED YEAST RICE ORAL Take 1 tablet by mouth once daily.    TURMERIC, BULK, MISC Take 1 tablet by mouth once daily.    UBIDECARENONE (CO Q-10 ORAL) Take 1 tablet by mouth once daily.    [DISCONTINUED] DOCOSAHEXANOIC ACID/EPA (FISH OIL ORAL) Take by mouth.    [DISCONTINUED] FOLIC ACID ORAL Take by mouth.    [DISCONTINUED] GREEN TEA LEAF EXTRACT (GREEN TEA ORAL) Take by mouth.    [DISCONTINUED] losartan (COZAAR) 50 MG tablet Take 50 mg by mouth.    [DISCONTINUED] metoprolol tartrate (LOPRESSOR) 25 MG tablet Take 25 mg by mouth.    [DISCONTINUED] NAPROXEN SODIUM (ALEVE ORAL) Take by mouth.    [DISCONTINUED] testosterone (ANDROGEL) 1 % (50 mg/5 gram) GlPk Apply 5 g topically once daily.     Family History       Problem Relation (Age of Onset)    Coronary artery disease Mother, Father          Tobacco Use    Smoking status: Never    Smokeless tobacco: Never   Substance and Sexual  Activity    Alcohol use: Not Currently    Drug use: Never    Sexual activity: Not on file     Review of Systems   All other systems reviewed and are negative.  Twelve point review of systems obtained and negative except as stated above in HPI     Objective:     Vital Signs (Most Recent):  Temp: 98 °F (36.7 °C) (01/24/23 1801)  Pulse: 94 (01/24/23 1801)  Resp: 18 (01/24/23 1801)  BP: 138/87 (01/24/23 1801)  SpO2: 98 % (01/24/23 1801) Vital Signs (24h Range):  Temp:  [98 °F (36.7 °C)-100.6 °F (38.1 °C)] 98 °F (36.7 °C)  Pulse:  [] 94  Resp:  [18] 18  SpO2:  [98 %] 98 %  BP: (138-141)/(87-91) 138/87     Weight: 115.2 kg (254 lb)  Body mass index is 36.45 kg/m².    Physical Exam  Vitals and nursing note reviewed.   Constitutional:       General: He is not in acute distress.     Appearance: Normal appearance. He is obese. He is not ill-appearing.      Comments: Sitting up in bed awake and alert, he is in no acute distress, is calm and cooperative, his wife is at bedside and also provides history.   HENT:      Head: Normocephalic.      Right Ear: External ear normal.      Left Ear: External ear normal.      Nose: Nose normal.      Mouth/Throat:      Mouth: Mucous membranes are moist.      Pharynx: Oropharynx is clear.   Eyes:      Conjunctiva/sclera: Conjunctivae normal.   Cardiovascular:      Rate and Rhythm: Normal rate and regular rhythm.      Pulses: Normal pulses.      Heart sounds: Normal heart sounds. No murmur heard.  Pulmonary:      Effort: Pulmonary effort is normal.      Breath sounds: Normal breath sounds. No wheezing or rhonchi.   Abdominal:      General: Abdomen is protuberant. Bowel sounds are normal.      Palpations: Abdomen is soft.      Comments: There is no tenderness to palpation in all quadrants, active bowel sounds.  Last normal bowel movement yesterday.   Musculoskeletal:         General: No swelling. Normal range of motion.      Cervical back: Normal range of motion.      Comments: Positive  with CVA tenderness.  Moves all extremities with good strength.  No edema.   Skin:     General: Skin is warm and dry.      Capillary Refill: Capillary refill takes less than 2 seconds.   Neurological:      General: No focal deficit present.      Mental Status: He is alert and oriented to person, place, and time.   Psychiatric:         Mood and Affect: Mood normal.         Behavior: Behavior normal.         Thought Content: Thought content normal.         Judgment: Judgment normal.           Significant Labs: All pertinent labs within the past 24 hours have been reviewed.    A1C:   Recent Labs   Lab 08/16/22  0848   HGBA1C 5.5     Bilirubin:   Recent Labs   Lab 01/24/23  1622   BILITOT 1.0     CBC:   Recent Labs   Lab 01/24/23  1622   WBC 13.91*   HGB 14.7   HCT 42.9        CMP:   Recent Labs   Lab 01/24/23  1622   *   K 3.8      CO2 22*      BUN 14   CREATININE 1.2   CALCIUM 9.6   PROT 8.0   ALBUMIN 4.1   BILITOT 1.0   ALKPHOS 55   AST 13   ALT 19   ANIONGAP 13     Urine Studies:   Recent Labs   Lab 01/24/23  1455   COLORU Yellow   PHUR 6.0   SPECGRAV 1.010   KETONESU Negative   NITRITE Negative   UROBILINOGEN 0.2       Significant Imaging: I have reviewed all pertinent imaging results/findings within the past 24 hours.

## 2023-01-25 NOTE — ASSESSMENT & PLAN NOTE
Most recent blood pressure 141/91, takes losartan in the evening, patient states he does not take olmesartan hydrochlorothiazide that is on his med list, resume losartan

## 2023-01-25 NOTE — H&P
[facility] - Emergency Dept  Hospital Medicine  History & Physical    Patient Name: Nancy Retana  MRN: [id number]  Patient Class: IP- Inpatient  Admission Date: 1/24/2023  Attending Physician: Caroline Espino MD   Primary Care Provider: Evens Sandoval MD         Patient information was obtained from patient, spouse/SO and ER records.     Subjective:     Principal Problem:Diverticulitis    Chief Complaint:   Chief Complaint   Patient presents with    Abnormal Ct Scan     Report showed perf diverticulum         HPI: Nancy Retana is a 52-year-old male with chronic medical problems including hypertension and a history of atrial fibrillation who presents to the emergency room complaining of left flank pain.  Patient states yesterday he woke not feeling well.  He was having a  constant, severe sharp left flank pain that was nonradiating.  No aggravating or relieving factors.  Associated with malaise, nausea and poor appetite.  He said he went to work and sat in his truck all day because he did not feel well enough to walk around his site.  He decided to drive home 2-1/2 hours and see his urologist and he thought he was having a kidney stone.  He said he had a kidney stone once before at age 18 and had similar type pain.  He said by the time he got to the urologist office he felt delirious with pain and he had a fever.  He was sent for outpatient lab work and CT renal stone protocol, lab work and was given a 1 g ceftriaxone IM prior to leaving the doctor's office.  He said he was not hardly out of the CT scan before they called him to tell him to go to the emergency room for evaluation.  He said he is feeling somewhat better, nausea is resolved and flank pain is much decreased but still there.  Temperature was 100.6° on arrival, heart rate 106, blood pressure 141/91 and WBC elevated at 13.91, BUN/CR 14/1 2, creatinine clearance 90.5.CT renal stone protocol showed mural thickening of the proximal to mid  descending colon with moderate pericolonic fat stranding and adjacent fascial thickening and a small punctate focus of free air or immediately adjacent to the involved diverticulum compatible with localized microperforation.  No fluid collection to indicate abscess and no obstruction.  ED physician spoke with general surgery who recommended admission and antibiotics and he would evaluate patient.  In the ED he was given a 1 L lactated Ringer's bolus and 4.5 g Zosyn.  Patient will be admitted to the hospitalist service for further evaluation and treatment with a consult to General surgery.      Past Medical History:   Diagnosis Date    Hypertension        Past Surgical History:   Procedure Laterality Date    CARDIAC ELECTROPHYSIOLOGY STUDY AND ABLATION      TONSILLECTOMY, ADENOIDECTOMY      VASECTOMY         Review of patient's allergies indicates:  No Known Allergies    Current Facility-Administered Medications on File Prior to Encounter   Medication    [COMPLETED] cefTRIAXone injection 1 g     Current Outpatient Medications on File Prior to Encounter   Medication Sig    aspirin 81 mg Cap Take 81 mg by mouth.    KRILL OIL ORAL Take 1 capsule by mouth once daily.    metoprolol succinate (TOPROL-XL) 25 MG 24 hr tablet Take 1 tablet (25 mg total) by mouth once daily.    multivitamin capsule Take 1 capsule by mouth once daily.    olmesartan-hydrochlorothiazide (BENICAR HCT) 20-12.5 mg per tablet Take 1 tablet by mouth once daily. (Patient taking differently: Take 1 tablet by mouth once daily. NEW Rx - NOT YET STARTED)    potassium chloride SA (K-DUR,KLOR-CON M) 10 MEQ tablet Take 2 tablets (20 mEq total) by mouth 2 (two) times daily.    RED YEAST RICE ORAL Take 1 tablet by mouth once daily.    TURMERIC, BULK, MISC Take 1 tablet by mouth once daily.    UBIDECARENONE (CO Q-10 ORAL) Take 1 tablet by mouth once daily.    [DISCONTINUED] DOCOSAHEXANOIC ACID/EPA (FISH OIL ORAL) Take by mouth.    [DISCONTINUED] FOLIC ACID ORAL  Take by mouth.    [DISCONTINUED] GREEN TEA LEAF EXTRACT (GREEN TEA ORAL) Take by mouth.    [DISCONTINUED] losartan (COZAAR) 50 MG tablet Take 50 mg by mouth.    [DISCONTINUED] metoprolol tartrate (LOPRESSOR) 25 MG tablet Take 25 mg by mouth.    [DISCONTINUED] NAPROXEN SODIUM (ALEVE ORAL) Take by mouth.    [DISCONTINUED] testosterone (ANDROGEL) 1 % (50 mg/5 gram) GlPk Apply 5 g topically once daily.     Family History       Problem Relation (Age of Onset)    Coronary artery disease Mother, Father          Tobacco Use    Smoking status: Never    Smokeless tobacco: Never   Substance and Sexual Activity    Alcohol use: Not Currently    Drug use: Never    Sexual activity: Not on file     Review of Systems   All other systems reviewed and are negative.  Twelve point review of systems obtained and negative except as stated above in HPI     Objective:     Vital Signs (Most Recent):  Temp: 98 °F (36.7 °C) (01/24/23 1801)  Pulse: 94 (01/24/23 1801)  Resp: 18 (01/24/23 1801)  BP: 138/87 (01/24/23 1801)  SpO2: 98 % (01/24/23 1801) Vital Signs (24h Range):  Temp:  [98 °F (36.7 °C)-100.6 °F (38.1 °C)] 98 °F (36.7 °C)  Pulse:  [] 94  Resp:  [18] 18  SpO2:  [98 %] 98 %  BP: (138-141)/(87-91) 138/87     Weight: 115.2 kg (254 lb)  Body mass index is 36.45 kg/m².    Physical Exam  Vitals and nursing note reviewed.   Constitutional:       General: He is not in acute distress.     Appearance: Normal appearance. He is obese. He is not ill-appearing.      Comments: Sitting up in bed awake and alert, he is in no acute distress, is calm and cooperative, his wife is at bedside and also provides history.   HENT:      Head: Normocephalic.      Right Ear: External ear normal.      Left Ear: External ear normal.      Nose: Nose normal.      Mouth/Throat:      Mouth: Mucous membranes are moist.      Pharynx: Oropharynx is clear.   Eyes:      Conjunctiva/sclera: Conjunctivae normal.   Cardiovascular:      Rate and Rhythm: Normal rate and  regular rhythm.      Pulses: Normal pulses.      Heart sounds: Normal heart sounds. No murmur heard.  Pulmonary:      Effort: Pulmonary effort is normal.      Breath sounds: Normal breath sounds. No wheezing or rhonchi.   Abdominal:      General: Abdomen is protuberant. Bowel sounds are normal.      Palpations: Abdomen is soft.      Comments: There is no tenderness to palpation in all quadrants, active bowel sounds.  Last normal bowel movement yesterday.   Musculoskeletal:         General: No swelling. Normal range of motion.      Cervical back: Normal range of motion.      Comments: Positive with CVA tenderness.  Moves all extremities with good strength.  No edema.   Skin:     General: Skin is warm and dry.      Capillary Refill: Capillary refill takes less than 2 seconds.   Neurological:      General: No focal deficit present.      Mental Status: He is alert and oriented to person, place, and time.   Psychiatric:         Mood and Affect: Mood normal.         Behavior: Behavior normal.         Thought Content: Thought content normal.         Judgment: Judgment normal.           Significant Labs: All pertinent labs within the past 24 hours have been reviewed.    A1C:   Recent Labs   Lab 08/16/22  0848   HGBA1C 5.5     Bilirubin:   Recent Labs   Lab 01/24/23  1622   BILITOT 1.0     CBC:   Recent Labs   Lab 01/24/23  1622   WBC 13.91*   HGB 14.7   HCT 42.9        CMP:   Recent Labs   Lab 01/24/23  1622   *   K 3.8      CO2 22*      BUN 14   CREATININE 1.2   CALCIUM 9.6   PROT 8.0   ALBUMIN 4.1   BILITOT 1.0   ALKPHOS 55   AST 13   ALT 19   ANIONGAP 13     Urine Studies:   Recent Labs   Lab 01/24/23  1455   COLORU Yellow   PHUR 6.0   SPECGRAV 1.010   KETONESU Negative   NITRITE Negative   UROBILINOGEN 0.2       Significant Imaging: I have reviewed all pertinent imaging results/findings within the past 24 hours.    CT RENAL STONE STUDY ABD PELVIS WO 1/24/2023     FINDINGS:  The liver is  hypoattenuating indicative of fatty infiltration.  The spleen, pancreas, and adrenal glands are unremarkable.     There is a punctate stone of the upper pole of the left kidney.  There is no ureterolithiasis or hydroureteronephrosis.     There is moderate short segment mural thickening of the proximal to mid descending colon extending for approximately 8 cm.  This is accompanied by moderate pericolonic fat stranding and adjacent fascial thickening.  These findings are centered about a diverticulum and are compatible with acute diverticulitis.  There is a single punctate focus of free air immediately adjacent to the involved diverticulum, compatible with a localized micro perforation.  There is no fluid collection to indicate abscess.  There is no bowel dilatation to indicate obstruction.     The appendix is normal.  There is mild calcification of the aorta without aneurysm.  Moderate lower lumbar facet arthropathy is present.     Impression:     Moderate, acute diverticulitis of the mid descending colon with localized micro perforation.  No evidence for abscess.     Punctate nonobstructive left nephrolithiasis.     Hepatic steatosis.     This report was flagged in Epic as abnormal.    Assessment/Plan:     * Diverticulitis  CT scan renal protocol with diverticulitis and microperforation, Continue Zosyn 3.375 g IV every 8 hours, pain control with hydrocodone/morphine, Zofran/Compazine for nausea, NPO except for sips with medications and ice chips, hold aspirin, consult General surgery      History of atrial fibrillation  Patient states in 2007 he had multiple ablations and has been in normal sinus rhythm since, resume metoprolol, patient takes in the evening      Essential hypertension  Most recent blood pressure 141/91, takes losartan in the evening, patient states he does not take olmesartan hydrochlorothiazide that is on his med list, resume losartan      VTE Risk Mitigation (From admission, onward)            Ordered     Reason for No Pharmacological VTE Prophylaxis  Once        Question:  Reasons:  Answer:  Patient is Ambulatory    01/24/23 1856     IP VTE HIGH RISK PATIENT  Once         01/24/23 1856     Place sequential compression device  Until discontinued         01/24/23 1856                   VTE prophylaxis: Ambulate, scd's    Patient was examined at 1825    Code status: FULL CODE      Mallika Burrell NP  Department of Hospital Medicine   [facility] - Emergency Dept

## 2023-01-25 NOTE — ASSESSMENT & PLAN NOTE
Continue Zosyn 3.375 g IV every 8 hours  Clears   Advancement of diet as per surgeon   D/w dr finnegan

## 2023-01-25 NOTE — CONSULTS
[facility]  General Surgery  Consult Note    Inpatient consult to General surgery  Consult performed by: Dayo Dang III, MD  Consult ordered by: Mallika Burrell NP  Reason for consult: Acute diverticulitis      Subjective:     Chief Complaint/Reason for Admission:  Acute diverticulitis  History of Present Illness: 52-year-old male admitted overnight with acute diverticulitis.  He presented with 2 day history of increasing left flank pain.  Renal stone suspected and CT stone protocol was performed.  No stone was appreciated but did have evidence of acute diverticulitis in the descending colon.  There was a possibility of small foci of extraluminal air it would be consistent with microperforation.  He was admitted.  He was started on Zosyn.  He states pain is much improved.  He has no abdominal pain.  He continues to have mild left flank pain.  He has been afebrile.  He has been hemodynamically stable.  No history of diverticulitis in the past.  No previous abdominal surgical history.  No previous colonoscopy.  He has past medical history of hypertension, chronic hypokalemia.  He is on beta-blocker, losartan, potassium supplement    No current facility-administered medications on file prior to encounter.     Current Outpatient Medications on File Prior to Encounter   Medication Sig    aspirin 81 mg Cap Take 81 mg by mouth.    KRILL OIL ORAL Take 1 capsule by mouth once daily.    losartan (COZAAR) 25 MG tablet Take 25 mg by mouth once daily.    metoprolol succinate (TOPROL-XL) 25 MG 24 hr tablet Take 1 tablet (25 mg total) by mouth once daily.    multivitamin capsule Take 1 capsule by mouth once daily.    potassium chloride SA (K-DUR,KLOR-CON M) 10 MEQ tablet Take 2 tablets (20 mEq total) by mouth 2 (two) times daily.    RED YEAST RICE ORAL Take 1 tablet by mouth once daily.    TURMERIC, BULK, MISC Take 1 tablet by mouth once daily.    UBIDECARENONE (CO Q-10 ORAL) Take 1 tablet by mouth once  daily.    [DISCONTINUED] olmesartan-hydrochlorothiazide (BENICAR HCT) 20-12.5 mg per tablet Take 1 tablet by mouth once daily. (Patient taking differently: Take 1 tablet by mouth once daily. NEW Rx - NOT YET STARTED)       Review of patient's allergies indicates:  No Known Allergies    Past Medical History:   Diagnosis Date    Hypertension      Past Surgical History:   Procedure Laterality Date    CARDIAC ELECTROPHYSIOLOGY STUDY AND ABLATION      TONSILLECTOMY, ADENOIDECTOMY      VASECTOMY       Family History       Problem Relation (Age of Onset)    Coronary artery disease Mother, Father          Tobacco Use    Smoking status: Never    Smokeless tobacco: Never   Substance and Sexual Activity    Alcohol use: Not Currently    Drug use: Never    Sexual activity: Not on file     Review of Systems   Constitutional:  Negative for appetite change, chills, fever and unexpected weight change.   HENT:  Negative for hearing loss, rhinorrhea, sore throat and voice change.    Eyes:  Negative for photophobia and visual disturbance.   Respiratory:  Negative for cough, choking and shortness of breath.    Cardiovascular:  Negative for chest pain, palpitations and leg swelling.   Gastrointestinal:  Negative for abdominal pain, blood in stool, constipation, diarrhea, nausea and vomiting.   Endocrine: Negative for cold intolerance, heat intolerance, polydipsia and polyuria.   Genitourinary:  Positive for flank pain.   Musculoskeletal:  Negative for arthralgias, back pain, joint swelling and neck stiffness.   Skin:  Negative for color change, pallor and rash.   Neurological:  Negative for dizziness, seizures, syncope and headaches.   Hematological:  Negative for adenopathy. Does not bruise/bleed easily.   Psychiatric/Behavioral:  Negative for agitation, behavioral problems and confusion.    Objective:     Vital Signs (Most Recent):  Temp: 97.1 °F (36.2 °C) (01/25/23 1659)  Pulse: 74 (01/25/23 1659)  Resp: 16 (01/25/23 1659)  BP: 121/85  (01/25/23 1659)  SpO2: 100 % (01/25/23 1659) Vital Signs (24h Range):  Temp:  [97.1 °F (36.2 °C)-98.5 °F (36.9 °C)] 97.1 °F (36.2 °C)  Pulse:  [74-96] 74  Resp:  [16-20] 16  SpO2:  [93 %-100 %] 100 %  BP: (102-142)/(63-96) 121/85     Weight: 112 kg (247 lb)  Body mass index is 35.44 kg/m².      Intake/Output Summary (Last 24 hours) at 1/25/2023 1708  Last data filed at 1/24/2023 2047  Gross per 24 hour   Intake 1100 ml   Output --   Net 1100 ml       Physical Exam  Vitals reviewed.   Constitutional:       General: He is awake. He is not in acute distress.     Appearance: Normal appearance. He is well-developed. He is not ill-appearing or toxic-appearing.   HENT:      Head: Normocephalic and atraumatic.   Pulmonary:      Effort: Pulmonary effort is normal. No tachypnea, bradypnea, accessory muscle usage or respiratory distress.   Abdominal:      General: There is no distension.      Palpations: Abdomen is soft.      Tenderness: There is no abdominal tenderness. There is left CVA tenderness. There is no guarding.   Musculoskeletal:      Cervical back: Neck supple.   Neurological:      Mental Status: He is alert and oriented to person, place, and time.   Psychiatric:         Behavior: Behavior is cooperative.       Significant Labs:  CBC:   Recent Labs   Lab 01/25/23  0538   WBC 9.48   RBC 4.36*   HGB 13.6*   HCT 40.8      MCV 94   MCH 31.2*   MCHC 33.3     CMP:   Recent Labs   Lab 01/24/23  1937 01/25/23  0538   GLU 93 100   CALCIUM 9.5 8.9   ALBUMIN 4.3  --    PROT 8.3  --     137   K 3.4* 3.7   CO2 24 24   CL 98 102   BUN 17 15   CREATININE 1.0 1.0   ALKPHOS 52*  --    ALT 19  --    AST 14  --    BILITOT 1.2*  --        Significant Diagnostics:  I have reviewed all pertinent imaging results/findings within the past 24 hours.    Assessment/Plan:     Active Diagnoses:    Diagnosis Date Noted POA    PRINCIPAL PROBLEM:  Diverticulitis with microperforation  [K57.92] 01/24/2023 Unknown    History of atrial  fibrillation [Z86.79] 01/24/2023 Not Applicable    Essential hypertension [I10] 01/24/2022 Yes      Problems Resolved During this Admission:   Patient admitted with acute diverticulitis.  He has been started on Zosyn.  He is hemodynamically stable.  He states he feels better.  No indication for emergent surgery today.  Will continue to follow.  Okay for clear liquids.    Thank you for your consult. I will follow-up with patient. Please contact us if you have any additional questions.    Dayo Dang III, MD  General Surgery  [facility]

## 2023-01-25 NOTE — ASSESSMENT & PLAN NOTE
Patient states in 2007 he had multiple ablations and has been in normal sinus rhythm since  metoprolol

## 2023-01-25 NOTE — PROGRESS NOTES
[facility]  Progress Note    Patient Name: Nancy Retana  MRN: [id number]  Patient Class: IP- Inpatient   Admission Date: 1/24/2023  Length of Stay: 1 days  Attending Physician: Leo Jack MD  Primary Care Provider: Evens Sandoval MD        Subjective:     Principal Problem:Diverticulitis        HPI:  Nancy Retana is a 52-year-old male with chronic medical problems including hypertension and a history of atrial fibrillation who presents to the emergency room complaining of left flank pain.  Patient states yesterday he woke not feeling well.  He was having a  constant, severe sharp left flank pain that was nonradiating.  No aggravating or relieving factors.  Associated with malaise, nausea and poor appetite.  He said he went to work and sat in his truck all day because he did not feel well enough to walk around his site.  He decided to drive home 2-1/2 hours and see his urologist and he thought he was having a kidney stone.  He said he had a kidney stone once before at age 18 and had similar type pain.  He said by the time he got to the urologist office he felt delirious with pain and he had a fever.  He was sent for outpatient lab work and CT renal stone protocol, lab work and was given a 1 g ceftriaxone IM prior to leaving the doctor's office.  He said he was not hardly out of the CT scan before they called him to tell him to go to the emergency room for evaluation.  He said he is feeling somewhat better, nausea is resolved and flank pain is much decreased but still there.  Temperature was 100.6° on arrival, heart rate 106, blood pressure 141/91 and WBC elevated at 13.91, BUN/CR 14/1 2, creatinine clearance 90.5.CT renal stone protocol showed mural thickening of the proximal to mid descending colon with moderate pericolonic fat stranding and adjacent fascial thickening and a small punctate focus of free air or immediately adjacent to the involved diverticulum compatible with  Your exams are  normal today  Follow up with PCP if Sx persists       localized microperforation.  No fluid collection to indicate abscess and no obstruction.  ED physician spoke with general surgery who recommended admission and antibiotics and he would evaluate patient.  In the ED he was given a 1 L lactated Ringer's bolus and 4.5 g Zosyn.  Patient will be admitted to the hospitalist service for further evaluation and treatment with a consult to General surgery.      Overview/Hospital Course:  Doing well   No fever or leucocytosis   Abdomen very benign . CT noted       Interval History:         Review of Systems  Objective:     Vital Signs (Most Recent):  Temp: 97.6 °F (36.4 °C) (01/25/23 1229)  Pulse: 82 (01/25/23 1229)  Resp: 18 (01/25/23 1229)  BP: 125/79 (01/25/23 1229)  SpO2: 98 % (01/25/23 1229) Vital Signs (24h Range):  Temp:  [97.6 °F (36.4 °C)-100.6 °F (38.1 °C)] 97.6 °F (36.4 °C)  Pulse:  [] 82  Resp:  [18-20] 18  SpO2:  [93 %-99 %] 98 %  BP: (102-142)/(63-96) 125/79     Weight: 112 kg (247 lb)  Body mass index is 35.44 kg/m².    Intake/Output Summary (Last 24 hours) at 1/25/2023 1435  Last data filed at 1/24/2023 2047  Gross per 24 hour   Intake 1100 ml   Output --   Net 1100 ml      Physical Exam  Constitutional:       General: He is not in acute distress.     Appearance: He is well-developed.   HENT:      Head: Normocephalic.   Eyes:      Pupils: Pupils are equal, round, and reactive to light.   Cardiovascular:      Rate and Rhythm: Normal rate and regular rhythm.   Pulmonary:      Effort: Pulmonary effort is normal. No respiratory distress.   Abdominal:      General: There is no distension.      Tenderness: There is no abdominal tenderness.   Musculoskeletal:         General: Normal range of motion.      Cervical back: Neck supple.   Skin:     Findings: No rash.   Neurological:      Mental Status: He is alert and oriented to person, place, and time.   Psychiatric:         Mood and Affect: Mood normal.       Significant Labs: All pertinent labs within the past 24  hours have been reviewed.  CBC:   Recent Labs   Lab 01/24/23  1622 01/24/23  1938 01/25/23  0538   WBC 13.91* 12.83* 9.48   HGB 14.7 13.9* 13.6*   HCT 42.9 40.7 40.8    218 198     CMP:   Recent Labs   Lab 01/24/23  1622 01/24/23  1937 01/25/23  0538   * 136 137   K 3.8 3.4* 3.7    98 102   CO2 22* 24 24    93 100   BUN 14 17 15   CREATININE 1.2 1.0 1.0   CALCIUM 9.6 9.5 8.9   PROT 8.0 8.3  --    ALBUMIN 4.1 4.3  --    BILITOT 1.0 1.2*  --    ALKPHOS 55 52*  --    AST 13 14  --    ALT 19 19  --    ANIONGAP 13 14 11       Significant Imaging: I have reviewed all pertinent imaging results/findings within the past 24 hours.      Assessment/Plan:      * Diverticulitis with microperforation   Continue Zosyn 3.375 g IV every 8 hours, pain control with hydrocodone/morphine, Zofran/Compazine for nausea, NPO except for sips with medications and ice chips, hold aspirin, consult General surgery      History of atrial fibrillation  Patient states in 2007 he had multiple ablations and has been in normal sinus rhythm since  metoprolol      Essential hypertension  Home meds      VTE Risk Mitigation (From admission, onward)         Ordered     Reason for No Pharmacological VTE Prophylaxis  Once        Question:  Reasons:  Answer:  Patient is Ambulatory    01/24/23 1856     IP VTE HIGH RISK PATIENT  Once         01/24/23 1856     Place sequential compression device  Until discontinued         01/24/23 1856                Discharge Planning   NEFTALY: 1/27/2023     Code Status: Full Code   Is the patient medically ready for discharge?:     Reason for patient still in hospital (select all that apply): Treatment  Discharge Plan A: Home with family                  Leo Jack MD  Department of Hospital Medicine   [facility]

## 2023-01-25 NOTE — ASSESSMENT & PLAN NOTE
Continue Zosyn 3.375 g IV every 8 hours, pain control with hydrocodone/morphine, Zofran/Compazine for nausea, NPO except for sips with medications and ice chips, hold aspirin, consult General surgery

## 2023-01-25 NOTE — ED PROVIDER NOTES
Encounter Date: 1/24/2023       History     Chief Complaint   Patient presents with    Abnormal Ct Scan     Report showed perf diverticulum      52-year-old male with a history of hypertension sent to the emergency department for an abnormal CT scan.  He developed left lower back pain last night.  He presented to his urologist's office today and was febrile with a temperature of 100.6°.  Was sent for a CT scan that revealed diverticulitis with local perforation and so he was sent to the emergency department for further management.  Reports that his fever resolved with 600 mg of ibuprofen.  No vomiting or diarrhea.  No history of diverticulitis in the past.    Review of patient's allergies indicates:  No Known Allergies  Past Medical History:   Diagnosis Date    Hypertension      Past Surgical History:   Procedure Laterality Date    CARDIAC ELECTROPHYSIOLOGY STUDY AND ABLATION      TONSILLECTOMY, ADENOIDECTOMY      VASECTOMY       History reviewed. No pertinent family history.  Social History     Tobacco Use    Smoking status: Never    Smokeless tobacco: Never   Substance Use Topics    Alcohol use: Not Currently    Drug use: Never     Review of Systems   Constitutional:  Positive for chills and fever.   Gastrointestinal:  Positive for abdominal pain.     Physical Exam     Initial Vitals [01/24/23 1801]   BP Pulse Resp Temp SpO2   138/87 94 18 98 °F (36.7 °C) 98 %      MAP       --         Physical Exam    Nursing note and vitals reviewed.  Constitutional: He appears well-developed and well-nourished. No distress.   HENT:   Head: Normocephalic and atraumatic.   Eyes: EOM are normal.   Neck:   Normal range of motion.  Cardiovascular:  Normal rate, regular rhythm, normal heart sounds and intact distal pulses.           No murmur heard.  Pulmonary/Chest: Breath sounds normal. No respiratory distress.   Abdominal: Abdomen is soft. Bowel sounds are normal. He exhibits no distension. There is no abdominal tenderness. There  is no rebound and no guarding.   Genitourinary:    Genitourinary Comments: Mild left CVA tenderness     Musculoskeletal:         General: Normal range of motion.      Cervical back: Normal range of motion.     Neurological: He is alert and oriented to person, place, and time.   Skin: Skin is warm and dry.   Psychiatric: He has a normal mood and affect.       ED Course   Critical Care    Date/Time: 1/24/2023 6:17 PM  Performed by: Kacey Fitch MD  Authorized by: Kacey Fitch MD   Direct patient critical care time: 15 minutes  Additional history critical care time: 5 minutes  Ordering / reviewing critical care time: 5 minutes  Documentation critical care time: 5 minutes  Consulting other physicians critical care time: 5 minutes  Total critical care time (exclusive of procedural time) : 35 minutes  Critical care time was exclusive of separately billable procedures and treating other patients and teaching time.  Critical care was necessary to treat or prevent imminent or life-threatening deterioration of the following conditions: Acute diverticulitis with perforation.  Critical care was time spent personally by me on the following activities: development of treatment plan with patient or surrogate, discussions with consultants and examination of patient.      Labs Reviewed   CULTURE, BLOOD   CULTURE, BLOOD   CBC W/ AUTO DIFFERENTIAL   COMPREHENSIVE METABOLIC PANEL   LIPASE   URINALYSIS, REFLEX TO URINE CULTURE   LACTIC ACID, PLASMA          Imaging Results    None          Medications   lactated ringers bolus 1,000 mL (has no administration in time range)   piperacillin-tazobactam 4.5 g in dextrose 5 % 100 mL IVPB (ready to mix system) (has no administration in time range)     Medical Decision Making:   ED Management:  52-year-old male sent from urology clinic for acute diverticulitis with microperforation on CT scan.  He is hemodynamically stable.  Labs reviewed from outpatient workup revealed  leukocytosis but no other gross abnormalities.  Will cover empirically with Zosyn.  Discussed the case with general surgery who will see the patient in consult.  Will consult Hospital Medicine for admission.    Kacey Fitch MD  Emergency Medicine  01/24/2023 6:17 PM                            Clinical Impression:   Final diagnoses:  [K57.20] Diverticulitis of colon with perforation (Primary)        ED Disposition Condition    Admit Stable                Kacey Fitch MD  01/24/23 4635

## 2023-01-25 NOTE — ASSESSMENT & PLAN NOTE
Patient states in 2007 he had multiple ablations and has been in normal sinus rhythm since, resume metoprolol, patient takes in the evening

## 2023-01-25 NOTE — HPI
Nancy Retana is a 52-year-old male with chronic medical problems including hypertension and a history of atrial fibrillation who presents to the emergency room complaining of left flank pain.  Patient states yesterday he woke not feeling well.  He was having a  constant, severe sharp left flank pain that was nonradiating.  No aggravating or relieving factors.  Associated with malaise, nausea and poor appetite.  He said he went to work and sat in his truck all day because he did not feel well enough to walk around his site.  He decided to drive home 2-1/2 hours and see his urologist and he thought he was having a kidney stone.  He said he had a kidney stone once before at age 18 and had similar type pain.  He said by the time he got to the urologist office he felt delirious with pain and he had a fever.  He was sent for outpatient lab work and CT renal stone protocol, lab work and was given a 1 g ceftriaxone IM prior to leaving the doctor's office.  He said he was not hardly out of the CT scan before they called him to tell him to go to the emergency room for evaluation.  He said he is feeling somewhat better, nausea is resolved and flank pain is much decreased but still there.  Temperature was 100.6° on arrival, heart rate 106, blood pressure 141/91 and WBC elevated at 13.91, BUN/CR 14/1 2, creatinine clearance 90.5.CT renal stone protocol showed mural thickening of the proximal to mid descending colon with moderate pericolonic fat stranding and adjacent fascial thickening and a small punctate focus of free air or immediately adjacent to the involved diverticulum compatible with localized microperforation.  No fluid collection to indicate abscess and no obstruction.  ED physician spoke with general surgery who recommended admission and antibiotics and he would evaluate patient.  In the ED he was given a 1 L lactated Ringer's bolus and 4.5 g Zosyn.  Patient will be admitted to the hospitalist service for further  evaluation and treatment with a consult to General surgery.

## 2023-01-25 NOTE — SUBJECTIVE & OBJECTIVE
Interval History:         Review of Systems  Objective:     Vital Signs (Most Recent):  Temp: 97.6 °F (36.4 °C) (01/25/23 1229)  Pulse: 82 (01/25/23 1229)  Resp: 18 (01/25/23 1229)  BP: 125/79 (01/25/23 1229)  SpO2: 98 % (01/25/23 1229) Vital Signs (24h Range):  Temp:  [97.6 °F (36.4 °C)-100.6 °F (38.1 °C)] 97.6 °F (36.4 °C)  Pulse:  [] 82  Resp:  [18-20] 18  SpO2:  [93 %-99 %] 98 %  BP: (102-142)/(63-96) 125/79     Weight: 112 kg (247 lb)  Body mass index is 35.44 kg/m².    Intake/Output Summary (Last 24 hours) at 1/25/2023 1435  Last data filed at 1/24/2023 2047  Gross per 24 hour   Intake 1100 ml   Output --   Net 1100 ml      Physical Exam  Constitutional:       General: He is not in acute distress.     Appearance: He is well-developed.   HENT:      Head: Normocephalic.   Eyes:      Pupils: Pupils are equal, round, and reactive to light.   Cardiovascular:      Rate and Rhythm: Normal rate and regular rhythm.   Pulmonary:      Effort: Pulmonary effort is normal. No respiratory distress.   Abdominal:      General: There is no distension.      Tenderness: There is no abdominal tenderness.   Musculoskeletal:         General: Normal range of motion.      Cervical back: Neck supple.   Skin:     Findings: No rash.   Neurological:      Mental Status: He is alert and oriented to person, place, and time.   Psychiatric:         Mood and Affect: Mood normal.       Significant Labs: All pertinent labs within the past 24 hours have been reviewed.  CBC:   Recent Labs   Lab 01/24/23  1622 01/24/23 1938 01/25/23  0538   WBC 13.91* 12.83* 9.48   HGB 14.7 13.9* 13.6*   HCT 42.9 40.7 40.8    218 198     CMP:   Recent Labs   Lab 01/24/23  1622 01/24/23 1937 01/25/23  0538   * 136 137   K 3.8 3.4* 3.7    98 102   CO2 22* 24 24    93 100   BUN 14 17 15   CREATININE 1.2 1.0 1.0   CALCIUM 9.6 9.5 8.9   PROT 8.0 8.3  --    ALBUMIN 4.1 4.3  --    BILITOT 1.0 1.2*  --    ALKPHOS 55 52*  --    AST 13 14  --     ALT 19 19  --    ANIONGAP 13 14 11       Significant Imaging: I have reviewed all pertinent imaging results/findings within the past 24 hours.

## 2023-01-25 NOTE — HOSPITAL COURSE
HPI: Nancy Retana is a 52-year-old male with chronic medical problems including hypertension and a history of atrial fibrillation who presents to the emergency room complaining of left flank pain.  Patient states yesterday he woke not feeling well.  He was having a  constant, severe sharp left flank pain that was nonradiating.  No aggravating or relieving factors.  Associated with malaise, nausea and poor appetite.  He said he went to work and sat in his truck all day because he did not feel well enough to walk around his site.  He decided to drive home 2-1/2 hours and see his urologist and he thought he was having a kidney stone.  He said he had a kidney stone once before at age 18 and had similar type pain.  He said by the time he got to the urologist office he felt delirious with pain and he had a fever.  He was sent for outpatient lab work and CT renal stone protocol, lab work and was given a 1 g ceftriaxone IM prior to leaving the doctor's office.  He said he was not hardly out of the CT scan before they called him to tell him to go to the emergency room for evaluation.  He said he is feeling somewhat better, nausea is resolved and flank pain is much decreased but still there.  Temperature was 100.6° on arrival, heart rate 106, blood pressure 141/91 and WBC elevated at 13.91, BUN/CR 14/1 2, creatinine clearance 90.5.CT renal stone protocol showed mural thickening of the proximal to mid descending colon with moderate pericolonic fat stranding and adjacent fascial thickening and a small punctate focus of free air or immediately adjacent to the involved diverticulum compatible with localized microperforation.  No fluid collection to indicate abscess and no obstruction.  ED physician spoke with general surgery who recommended admission and antibiotics and he would evaluate patient.  In the ED he was given a 1 L lactated Ringer's bolus and 4.5 g Zosyn.  Patient will be admitted to the hospitalist service for  further evaluation and treatment with a consult to General surgery.    Hospital course  Patient was admitted with abdominal pain and later found to have micro perforated diverticulitis and surgeon consulted  IV antibiotics started and later patient's abdomen is benign and no fever and no leukocytosis and negative cultures and discharged with Augmentin 2 weeks and follow up with Dr. Dang as outpatient.  He was advised soft diet/liquid diet at discharge

## 2023-01-26 VITALS
RESPIRATION RATE: 16 BRPM | HEIGHT: 70 IN | WEIGHT: 247 LBS | HEART RATE: 66 BPM | TEMPERATURE: 99 F | DIASTOLIC BLOOD PRESSURE: 67 MMHG | BODY MASS INDEX: 35.36 KG/M2 | OXYGEN SATURATION: 95 % | SYSTOLIC BLOOD PRESSURE: 113 MMHG

## 2023-01-26 LAB
ANION GAP SERPL CALC-SCNC: 6 MMOL/L (ref 8–16)
BASOPHILS # BLD AUTO: 0.04 K/UL (ref 0–0.2)
BASOPHILS NFR BLD: 0.5 % (ref 0–1.9)
BUN SERPL-MCNC: 13 MG/DL (ref 6–20)
CALCIUM SERPL-MCNC: 8.7 MG/DL (ref 8.7–10.5)
CHLORIDE SERPL-SCNC: 105 MMOL/L (ref 95–110)
CO2 SERPL-SCNC: 27 MMOL/L (ref 23–29)
CREAT SERPL-MCNC: 1 MG/DL (ref 0.5–1.4)
DIFFERENTIAL METHOD: ABNORMAL
EOSINOPHIL # BLD AUTO: 0.1 K/UL (ref 0–0.5)
EOSINOPHIL NFR BLD: 1.4 % (ref 0–8)
ERYTHROCYTE [DISTWIDTH] IN BLOOD BY AUTOMATED COUNT: 13 % (ref 11.5–14.5)
EST. GFR  (NO RACE VARIABLE): >60 ML/MIN/1.73 M^2
GLUCOSE SERPL-MCNC: 105 MG/DL (ref 70–110)
HCT VFR BLD AUTO: 40.3 % (ref 40–54)
HGB BLD-MCNC: 13.4 G/DL (ref 14–18)
IMM GRANULOCYTES # BLD AUTO: 0.03 K/UL (ref 0–0.04)
IMM GRANULOCYTES NFR BLD AUTO: 0.4 % (ref 0–0.5)
LYMPHOCYTES # BLD AUTO: 2.2 K/UL (ref 1–4.8)
LYMPHOCYTES NFR BLD: 30.6 % (ref 18–48)
MAGNESIUM SERPL-MCNC: 2.4 MG/DL (ref 1.6–2.6)
MCH RBC QN AUTO: 31.5 PG (ref 27–31)
MCHC RBC AUTO-ENTMCNC: 33.3 G/DL (ref 32–36)
MCV RBC AUTO: 95 FL (ref 82–98)
MONOCYTES # BLD AUTO: 0.6 K/UL (ref 0.3–1)
MONOCYTES NFR BLD: 7.9 % (ref 4–15)
NEUTROPHILS # BLD AUTO: 4.3 K/UL (ref 1.8–7.7)
NEUTROPHILS NFR BLD: 59.2 % (ref 38–73)
NRBC BLD-RTO: 0 /100 WBC
PLATELET # BLD AUTO: 213 K/UL (ref 150–450)
PMV BLD AUTO: 9.2 FL (ref 9.2–12.9)
POTASSIUM SERPL-SCNC: 4.1 MMOL/L (ref 3.5–5.1)
RBC # BLD AUTO: 4.26 M/UL (ref 4.6–6.2)
SODIUM SERPL-SCNC: 138 MMOL/L (ref 136–145)
WBC # BLD AUTO: 7.31 K/UL (ref 3.9–12.7)

## 2023-01-26 PROCEDURE — 25000003 PHARM REV CODE 250: Performed by: NURSE PRACTITIONER

## 2023-01-26 PROCEDURE — 63600175 PHARM REV CODE 636 W HCPCS: Performed by: NURSE PRACTITIONER

## 2023-01-26 PROCEDURE — 80048 BASIC METABOLIC PNL TOTAL CA: CPT | Performed by: NURSE PRACTITIONER

## 2023-01-26 PROCEDURE — 36415 COLL VENOUS BLD VENIPUNCTURE: CPT | Performed by: NURSE PRACTITIONER

## 2023-01-26 PROCEDURE — 83735 ASSAY OF MAGNESIUM: CPT | Performed by: NURSE PRACTITIONER

## 2023-01-26 PROCEDURE — 85025 COMPLETE CBC W/AUTO DIFF WBC: CPT | Performed by: NURSE PRACTITIONER

## 2023-01-26 PROCEDURE — 25000003 PHARM REV CODE 250: Performed by: INTERNAL MEDICINE

## 2023-01-26 RX ORDER — AMOXICILLIN AND CLAVULANATE POTASSIUM 875; 125 MG/1; MG/1
1 TABLET, FILM COATED ORAL EVERY 12 HOURS
Qty: 28 TABLET | Refills: 0 | Status: SHIPPED | OUTPATIENT
Start: 2023-01-26

## 2023-01-26 RX ADMIN — POTASSIUM CHLORIDE 10 MEQ: 750 CAPSULE, EXTENDED RELEASE ORAL at 09:01

## 2023-01-26 RX ADMIN — PIPERACILLIN SODIUM AND TAZOBACTAM SODIUM 3.38 G: 3; .375 INJECTION, POWDER, LYOPHILIZED, FOR SOLUTION INTRAVENOUS at 02:01

## 2023-01-26 NOTE — PLAN OF CARE
Patient cleared for discharge from case management standpoint.    Patient to schedule follow up with Dr Schaefer    Chart and discharge orders reviewed.  Patient discharged home with no further case management needs.       01/26/23 1107   Final Note   Assessment Type Final Discharge Note   Anticipated Discharge Disposition Home   What phone number can be called within the next 1-3 days to see how you are doing after discharge? [phone]   Post-Acute Status   Discharge Delays None known at this time

## 2023-01-26 NOTE — PROGRESS NOTES
Discharge instructions given to pt, pt verbalized understanding. PIV removed. Tele removed. Pt to schedule fu with gen surg. Okay to have soft foods until antbx are completed. Pt declined wheelchair, walked out with spouse and nursing student.

## 2023-01-26 NOTE — DISCHARGE SUMMARY
[facility] Medicine  Discharge Summary      Patient Name: Nancy Retana  MRN: [id number]  [facility]: [id number]  Patient Class: IP- Inpatient  Admission Date: 1/24/2023  Hospital Length of Stay: 2 days  Discharge Date and Time:  01/26/2023 5:33 PM  Attending Physician: No att. providers found   Discharging Provider: Leo Jack MD  Primary Care Provider: Evens Sandoval MD    Primary Care Team: Networked reference to record PCT     HPI:   Nancy Retana is a 52-year-old male with chronic medical problems including hypertension and a history of atrial fibrillation who presents to the emergency room complaining of left flank pain.  Patient states yesterday he woke not feeling well.  He was having a  constant, severe sharp left flank pain that was nonradiating.  No aggravating or relieving factors.  Associated with malaise, nausea and poor appetite.  He said he went to work and sat in his truck all day because he did not feel well enough to walk around his site.  He decided to drive home 2-1/2 hours and see his urologist and he thought he was having a kidney stone.  He said he had a kidney stone once before at age 18 and had similar type pain.  He said by the time he got to the urologist office he felt delirious with pain and he had a fever.  He was sent for outpatient lab work and CT renal stone protocol, lab work and was given a 1 g ceftriaxone IM prior to leaving the doctor's office.  He said he was not hardly out of the CT scan before they called him to tell him to go to the emergency room for evaluation.  He said he is feeling somewhat better, nausea is resolved and flank pain is much decreased but still there.  Temperature was 100.6° on arrival, heart rate 106, blood pressure 141/91 and WBC elevated at 13.91, BUN/CR 14/1 2, creatinine clearance 90.5.CT renal stone protocol showed mural thickening of the proximal to mid descending colon with moderate pericolonic fat stranding and adjacent  fascial thickening and a small punctate focus of free air or immediately adjacent to the involved diverticulum compatible with localized microperforation.  No fluid collection to indicate abscess and no obstruction.  ED physician spoke with general surgery who recommended admission and antibiotics and he would evaluate patient.  In the ED he was given a 1 L lactated Ringer's bolus and 4.5 g Zosyn.  Patient will be admitted to the hospitalist service for further evaluation and treatment with a consult to General surgery.      * No surgery found *      Hospital Course:   HPI: Nancy Retana is a 52-year-old male with chronic medical problems including hypertension and a history of atrial fibrillation who presents to the emergency room complaining of left flank pain.  Patient states yesterday he woke not feeling well.  He was having a  constant, severe sharp left flank pain that was nonradiating.  No aggravating or relieving factors.  Associated with malaise, nausea and poor appetite.  He said he went to work and sat in his truck all day because he did not feel well enough to walk around his site.  He decided to drive home 2-1/2 hours and see his urologist and he thought he was having a kidney stone.  He said he had a kidney stone once before at age 18 and had similar type pain.  He said by the time he got to the urologist office he felt delirious with pain and he had a fever.  He was sent for outpatient lab work and CT renal stone protocol, lab work and was given a 1 g ceftriaxone IM prior to leaving the doctor's office.  He said he was not hardly out of the CT scan before they called him to tell him to go to the emergency room for evaluation.  He said he is feeling somewhat better, nausea is resolved and flank pain is much decreased but still there.  Temperature was 100.6° on arrival, heart rate 106, blood pressure 141/91 and WBC elevated at 13.91, BUN/CR 14/1 2, creatinine clearance 90.5.CT renal stone protocol  showed mural thickening of the proximal to mid descending colon with moderate pericolonic fat stranding and adjacent fascial thickening and a small punctate focus of free air or immediately adjacent to the involved diverticulum compatible with localized microperforation.  No fluid collection to indicate abscess and no obstruction.  ED physician spoke with general surgery who recommended admission and antibiotics and he would evaluate patient.  In the ED he was given a 1 L lactated Ringer's bolus and 4.5 g Zosyn.  Patient will be admitted to the hospitalist service for further evaluation and treatment with a consult to General surgery.    Hospital course  Patient was admitted with abdominal pain and later found to have micro perforated diverticulitis and surgeon consulted  IV antibiotics started and later patient's abdomen is benign and no fever and no leukocytosis and negative cultures and discharged with Augmentin 2 weeks and follow up with Dr. Dang as outpatient.  He was advised soft diet/liquid diet at discharge       Goals of Care Treatment Preferences:  Code Status: Full Code      Consults:   Consults (From admission, onward)        Status Ordering Provider     Inpatient consult to General surgery  Once        Provider:  Dayo Dang III, MD    Completed KASSY LIMA          No new Assessment & Plan notes have been filed under this hospital service since the last note was generated.  Service: Hospital Medicine    Final Active Diagnoses:    Diagnosis Date Noted POA    PRINCIPAL PROBLEM:  Diverticulitis with microperforation  [K57.92] 01/24/2023 Unknown    Diverticulitis of colon with perforation [K57.20] 01/25/2023 Unknown    History of atrial fibrillation [Z86.79] 01/24/2023 Not Applicable    Essential hypertension [I10] 01/24/2022 Yes      Problems Resolved During this Admission:       Discharged Condition: good    Disposition: Home or Self Care    Follow Up:   Follow-up Information      Dayo Dang III, MD Follow up in 1 week(s).    Specialties: General Surgery, Surgery  Contact information:  [facility]  [address]  [place] [id number] [phone]                       Patient Instructions:      Diet Cardiac     Activity as tolerated       Significant Diagnostic Studies: Labs:   CMP   Recent Labs   Lab 01/24/23 1937 01/25/23  0538 01/26/23  0510    137 138   K 3.4* 3.7 4.1   CL 98 102 105   CO2 24 24 27   GLU 93 100 105   BUN 17 15 13   CREATININE 1.0 1.0 1.0   CALCIUM 9.5 8.9 8.7   PROT 8.3  --   --    ALBUMIN 4.3  --   --    BILITOT 1.2*  --   --    ALKPHOS 52*  --   --    AST 14  --   --    ALT 19  --   --    ANIONGAP 14 11 6*    and CBC   Recent Labs   Lab 01/24/23 1938 01/25/23  0538 01/26/23  0510   WBC 12.83* 9.48 7.31   HGB 13.9* 13.6* 13.4*   HCT 40.7 40.8 40.3    198 213       Pending Diagnostic Studies:     None         Medications:  Reconciled Home Medications:      Medication List      START taking these medications    amoxicillin-clavulanate 875-125mg 875-125 mg per tablet  Commonly known as: AUGMENTIN  Take 1 tablet by mouth every 12 (twelve) hours.        CONTINUE taking these medications    aspirin 81 mg Cap  Take 81 mg by mouth.     CO Q-10 ORAL  Take 1 tablet by mouth once daily.     KRILL OIL ORAL  Take 1 capsule by mouth once daily.     losartan 25 MG tablet  Commonly known as: COZAAR  Take 25 mg by mouth once daily.     metoprolol succinate 25 MG 24 hr tablet  Commonly known as: TOPROL-XL  Take 1 tablet (25 mg total) by mouth once daily.     multivitamin capsule  Take 1 capsule by mouth once daily.     potassium chloride SA 10 MEQ tablet  Commonly known as: K-DUR,KLOR-CON M  Take 2 tablets (20 mEq total) by mouth 2 (two) times daily.     RED YEAST RICE ORAL  Take 1 tablet by mouth once daily.     TURMERIC (BULK) MISC  Take 1 tablet by mouth once daily.            Indwelling Lines/Drains at time of discharge:   Lines/Drains/Airways     None                General: Patient resting comfortably in no acute distress. Appears as stated age. Calm  Eyes: EOM intact. No conjunctivae injection. No scleral icterus.  ENT: Hearing grossly intact. No discharge from ears. No nasal discharge.   CVS: RRR. No LE edema BL.  Lungs: CTA BL, no wheezing or crackles. Good breath sounds. No accessory muscle use. No acute respiratory distress  Neuro: non focal , Follows commands. Responds appropriately  Time spent on the discharge of patient: 22 minutes         Leo Jack MD  Department of Hospital Medicine  Formerly [facility]

## 2023-01-27 ENCOUNTER — OFFICE VISIT (OUTPATIENT)
Dept: ORTHOPEDICS | Facility: CLINIC | Age: 53
End: 2023-01-27
Payer: COMMERCIAL

## 2023-01-27 VITALS — BODY MASS INDEX: 34.36 KG/M2 | HEIGHT: 70 IN | WEIGHT: 240 LBS

## 2023-01-27 DIAGNOSIS — M53.3 COCCYDYNIA: ICD-10-CM

## 2023-01-27 DIAGNOSIS — M47.816 FACET ARTHRITIS OF LUMBAR REGION: Primary | ICD-10-CM

## 2023-01-27 PROCEDURE — 20552 NJX 1/MLT TRIGGER POINT 1/2: CPT | Mod: S$GLB,,, | Performed by: PHYSICIAN ASSISTANT

## 2023-01-27 PROCEDURE — 1111F PR DISCHARGE MEDS RECONCILED W/ CURRENT OUTPATIENT MED LIST: ICD-10-PCS | Mod: CPTII,S$GLB,, | Performed by: PHYSICIAN ASSISTANT

## 2023-01-27 PROCEDURE — 99213 PR OFFICE/OUTPT VISIT, EST, LEVL III, 20-29 MIN: ICD-10-PCS | Mod: 25,S$GLB,, | Performed by: PHYSICIAN ASSISTANT

## 2023-01-27 PROCEDURE — 1111F DSCHRG MED/CURRENT MED MERGE: CPT | Mod: CPTII,S$GLB,, | Performed by: PHYSICIAN ASSISTANT

## 2023-01-27 PROCEDURE — 1160F RVW MEDS BY RX/DR IN RCRD: CPT | Mod: CPTII,S$GLB,, | Performed by: PHYSICIAN ASSISTANT

## 2023-01-27 PROCEDURE — 1159F MED LIST DOCD IN RCRD: CPT | Mod: CPTII,S$GLB,, | Performed by: PHYSICIAN ASSISTANT

## 2023-01-27 PROCEDURE — 4010F ACE/ARB THERAPY RXD/TAKEN: CPT | Mod: CPTII,S$GLB,, | Performed by: PHYSICIAN ASSISTANT

## 2023-01-27 PROCEDURE — 20552 TRIGGER POINT INJECTION: ICD-10-PCS | Mod: S$GLB,,, | Performed by: PHYSICIAN ASSISTANT

## 2023-01-27 PROCEDURE — 99213 OFFICE O/P EST LOW 20 MIN: CPT | Mod: 25,S$GLB,, | Performed by: PHYSICIAN ASSISTANT

## 2023-01-27 PROCEDURE — 3008F PR BODY MASS INDEX (BMI) DOCUMENTED: ICD-10-PCS | Mod: CPTII,S$GLB,, | Performed by: PHYSICIAN ASSISTANT

## 2023-01-27 PROCEDURE — 1160F PR REVIEW ALL MEDS BY PRESCRIBER/CLIN PHARMACIST DOCUMENTED: ICD-10-PCS | Mod: CPTII,S$GLB,, | Performed by: PHYSICIAN ASSISTANT

## 2023-01-27 PROCEDURE — 3044F HG A1C LEVEL LT 7.0%: CPT | Mod: CPTII,S$GLB,, | Performed by: PHYSICIAN ASSISTANT

## 2023-01-27 PROCEDURE — 3008F BODY MASS INDEX DOCD: CPT | Mod: CPTII,S$GLB,, | Performed by: PHYSICIAN ASSISTANT

## 2023-01-27 PROCEDURE — 3044F PR MOST RECENT HEMOGLOBIN A1C LEVEL <7.0%: ICD-10-PCS | Mod: CPTII,S$GLB,, | Performed by: PHYSICIAN ASSISTANT

## 2023-01-27 PROCEDURE — 1159F PR MEDICATION LIST DOCUMENTED IN MEDICAL RECORD: ICD-10-PCS | Mod: CPTII,S$GLB,, | Performed by: PHYSICIAN ASSISTANT

## 2023-01-27 PROCEDURE — 4010F PR ACE/ARB THEARPY RXD/TAKEN: ICD-10-PCS | Mod: CPTII,S$GLB,, | Performed by: PHYSICIAN ASSISTANT

## 2023-01-27 RX ORDER — METHYLPREDNISOLONE ACETATE 40 MG/ML
40 INJECTION, SUSPENSION INTRA-ARTICULAR; INTRALESIONAL; INTRAMUSCULAR; SOFT TISSUE
Status: DISCONTINUED | OUTPATIENT
Start: 2023-01-27 | End: 2023-01-27 | Stop reason: HOSPADM

## 2023-01-27 RX ADMIN — METHYLPREDNISOLONE ACETATE 40 MG: 40 INJECTION, SUSPENSION INTRA-ARTICULAR; INTRALESIONAL; INTRAMUSCULAR; SOFT TISSUE at 10:01

## 2023-01-27 NOTE — PROCEDURES
Trigger Point Injection  Performed by: Cesar Capps PA-C  Authorized by: Cesar Capps PA-C       Consent Done?:  Yes (Verbal)    Pre-Procedure:   Indications:  Pain  Site marked: the procedure site was marked     Timeout: prior to procedure the correct patient, procedure, and site was verified    Prep: patient was prepped and draped in usual sterile fashion     Local anesthesia used?: Yes    Local anesthetic:  Lidocaine 1% without epinephrine  Medications: 40 mg methylPREDNISolone acetate 40 mg/mL  Sacral:  Bilateral

## 2023-01-27 NOTE — PROGRESS NOTES
[facility]  [address]  [place] [address]  Phone: [phone]   [phone]    Patient's PCP: Evens Sandoval MD  Referring Provider: No ref. provider found    Subjective:      Chief Complaint:   Chief Complaint   Patient presents with    Spine - Pain     Patient is here with complaints of low back pain, pain has returned over the last week, got a trigger point injection that helped, would like another today.        Past Medical History:   Diagnosis Date    Hypertension        Past Surgical History:   Procedure Laterality Date    CARDIAC ELECTROPHYSIOLOGY STUDY AND ABLATION      TONSILLECTOMY, ADENOIDECTOMY      VASECTOMY         Current Outpatient Medications   Medication Sig    amoxicillin-clavulanate 875-125mg (AUGMENTIN) 875-125 mg per tablet Take 1 tablet by mouth every 12 (twelve) hours.    aspirin 81 mg Cap Take 81 mg by mouth.    KRILL OIL ORAL Take 1 capsule by mouth once daily.    losartan (COZAAR) 25 MG tablet Take 25 mg by mouth once daily.    metoprolol succinate (TOPROL-XL) 25 MG 24 hr tablet Take 1 tablet (25 mg total) by mouth once daily.    multivitamin capsule Take 1 capsule by mouth once daily.    potassium chloride SA (K-DUR,KLOR-CON M) 10 MEQ tablet Take 2 tablets (20 mEq total) by mouth 2 (two) times daily.    RED YEAST RICE ORAL Take 1 tablet by mouth once daily.    TURMERIC, BULK, MISC Take 1 tablet by mouth once daily.    UBIDECARENONE (CO Q-10 ORAL) Take 1 tablet by mouth once daily.     No current facility-administered medications for this visit.       Review of patient's allergies indicates:  No Known Allergies    Family History   Problem Relation Age of Onset    Coronary artery disease Mother     Coronary artery disease Father        Social History     Socioeconomic History    Marital status:    Tobacco Use    Smoking status: Never    Smokeless tobacco: Never   Substance and Sexual Activity    Alcohol use: Not Currently    Drug use: Never        History of present illness:  Nancy comes in today for recurrence of coccyx pain.  He had a similar episode of this approximately 13-14 months ago where he had a injection at his coccyx which provided great relief of his symptoms until recently.  Denies any incidence of lower extremity radicular symptoms.  Denies any history of back pain.    Review of Systems:    Constitutional: Negative for chills, fever and weight loss.   HENT: Negative for congestion.    Eyes: Negative for discharge and redness.   Respiratory: Negative for cough and shortness of breath.    Cardiovascular: Negative for chest pain.   Gastrointestinal: Negative for nausea and vomiting.   Musculoskeletal: See HPI.   Skin: Negative for rash.   Neurological: Negative for headaches.   Endo/Heme/Allergies: Does not bruise/bleed easily.   Psychiatric/Behavioral: The patient is not nervous/anxious.    All other systems reviewed and are negative.       Objective:      Physical Examination:    Vital Signs:  There were no vitals filed for this visit.    Body mass index is 34.44 kg/m².    This a well-developed, well nourished patient in no acute distress.  They are alert and oriented and cooperative to examination.     Lumbar exam:  Patient is neurovascularly intact throughout bilateral lower extremities.  She has a negative straight leg raise bilaterally.  Bilateral calves are soft and nontender.  Has well-preserved internal/external rotation of bilateral hips without pain.  He is point tender to palpation in the cleft of his glutes at the base of his coccyx.  This is where his pain was previously.  Reports it is the same/similar.  He is no real tenderness about his lumbar paravertebrals into the lumbosacral junction.  He is able to sit and stand and ambulate without difficulty.  His gait is nonantalgic.    Pertinent New Results:        XRAY Report / Interpretation:   A single AP pelvis was taken today.  Reveals no acute fractures or dislocations.   Visualized soft tissues appear unremarkable.      Two views were taken of his lumbar spine today: AP and lateral views.  They reveal no acute fractures or dislocations.  Patient does have mild multilevel degenerative disc disease throughout his lumbar spine, worse at L4-5 and L5-S1.  More significantly, he does have sclerosis throughout the lower lumbar spine in his facet joints.      Assessment:       1. Facet arthritis of lumbar region    2. Coccydynia      Plan:     Facet arthritis of lumbar region  -     X-Ray Lumbar Spine Ap And Lateral  -     X-Ray Pelvis Routine AP  -     Trigger Point Injection    Coccydynia        Follow up if symptoms worsen or fail to improve.    Patient was given a coccyx injection with 40 mg of Depo-Medrol and 2 cc lidocaine.  He tolerated this well.  If this pain persists then recommend that he follow-up with us in the next 2 weeks for further evaluation and treatment.  Otherwise, he can follow up with us on an as-needed basis.           Cesar Capps, MPAS, PA-C    This note was created using [employer] voice recognition software that occasionally misinterprets words or phrases.

## 2023-01-29 LAB
BACTERIA BLD CULT: NORMAL
BACTERIA BLD CULT: NORMAL

## 2023-02-16 ENCOUNTER — OFFICE VISIT (OUTPATIENT)
Dept: SURGERY | Facility: CLINIC | Age: 53
End: 2023-02-16
Payer: COMMERCIAL

## 2023-02-16 VITALS — TEMPERATURE: 98 F | SYSTOLIC BLOOD PRESSURE: 136 MMHG | HEART RATE: 75 BPM | DIASTOLIC BLOOD PRESSURE: 96 MMHG

## 2023-02-16 DIAGNOSIS — K57.32 DIVERTICULITIS OF SIGMOID COLON: Primary | ICD-10-CM

## 2023-02-16 PROCEDURE — 3044F PR MOST RECENT HEMOGLOBIN A1C LEVEL <7.0%: ICD-10-PCS | Mod: CPTII,S$GLB,, | Performed by: SURGERY

## 2023-02-16 PROCEDURE — 3044F HG A1C LEVEL LT 7.0%: CPT | Mod: CPTII,S$GLB,, | Performed by: SURGERY

## 2023-02-16 PROCEDURE — 1160F PR REVIEW ALL MEDS BY PRESCRIBER/CLIN PHARMACIST DOCUMENTED: ICD-10-PCS | Mod: CPTII,S$GLB,, | Performed by: SURGERY

## 2023-02-16 PROCEDURE — 4010F PR ACE/ARB THEARPY RXD/TAKEN: ICD-10-PCS | Mod: CPTII,S$GLB,, | Performed by: SURGERY

## 2023-02-16 PROCEDURE — 99213 OFFICE O/P EST LOW 20 MIN: CPT | Mod: S$GLB,,, | Performed by: SURGERY

## 2023-02-16 PROCEDURE — 3075F PR MOST RECENT SYSTOLIC BLOOD PRESS GE 130-139MM HG: ICD-10-PCS | Mod: CPTII,S$GLB,, | Performed by: SURGERY

## 2023-02-16 PROCEDURE — 1111F PR DISCHARGE MEDS RECONCILED W/ CURRENT OUTPATIENT MED LIST: ICD-10-PCS | Mod: CPTII,S$GLB,, | Performed by: SURGERY

## 2023-02-16 PROCEDURE — 99213 PR OFFICE/OUTPT VISIT, EST, LEVL III, 20-29 MIN: ICD-10-PCS | Mod: S$GLB,,, | Performed by: SURGERY

## 2023-02-16 PROCEDURE — 3075F SYST BP GE 130 - 139MM HG: CPT | Mod: CPTII,S$GLB,, | Performed by: SURGERY

## 2023-02-16 PROCEDURE — 3080F DIAST BP >= 90 MM HG: CPT | Mod: CPTII,S$GLB,, | Performed by: SURGERY

## 2023-02-16 PROCEDURE — 1159F MED LIST DOCD IN RCRD: CPT | Mod: CPTII,S$GLB,, | Performed by: SURGERY

## 2023-02-16 PROCEDURE — 1159F PR MEDICATION LIST DOCUMENTED IN MEDICAL RECORD: ICD-10-PCS | Mod: CPTII,S$GLB,, | Performed by: SURGERY

## 2023-02-16 PROCEDURE — 1160F RVW MEDS BY RX/DR IN RCRD: CPT | Mod: CPTII,S$GLB,, | Performed by: SURGERY

## 2023-02-16 PROCEDURE — 4010F ACE/ARB THERAPY RXD/TAKEN: CPT | Mod: CPTII,S$GLB,, | Performed by: SURGERY

## 2023-02-16 PROCEDURE — 1111F DSCHRG MED/CURRENT MED MERGE: CPT | Mod: CPTII,S$GLB,, | Performed by: SURGERY

## 2023-02-16 PROCEDURE — 3080F PR MOST RECENT DIASTOLIC BLOOD PRESSURE >= 90 MM HG: ICD-10-PCS | Mod: CPTII,S$GLB,, | Performed by: SURGERY

## 2023-02-16 RX ORDER — METOPROLOL TARTRATE 50 MG/1
50 TABLET ORAL 2 TIMES DAILY
COMMUNITY
Start: 2023-02-12

## 2023-02-16 NOTE — PROGRESS NOTES
Subjective:       Patient ID: Nancy Retana is a 52 y.o. male.    Chief Complaint:  Hospital follow-up for diverticulitis    HPI:  52-year-old male returns the office for follow-up.  He was seen in the hospital as a inpatient consult for acute sigmoid diverticulitis.  His CT showed acute diverticulitis with the possibility of a small extraluminal foci of free air suspicious for microperforation.  He received 2 days of Zosyn and was discharged on of Augmentin the.  He states he has felt very well since discharge.  He has had no pain.  He no fevers or chills.  He completed his antibiotics yesterday.  He has never had colonoscopy.  He has no abdominal surgical history.  This was his 1st case of diverticulitis.    Past Medical History:   Diagnosis Date    Hypertension      Past Surgical History:   Procedure Laterality Date    CARDIAC ELECTROPHYSIOLOGY STUDY AND ABLATION      TONSILLECTOMY, ADENOIDECTOMY      VASECTOMY       Review of patient's allergies indicates:  No Known Allergies  Medication List with Changes/Refills   Current Medications    AMOXICILLIN-CLAVULANATE 875-125MG (AUGMENTIN) 875-125 MG PER TABLET    Take 1 tablet by mouth every 12 (twelve) hours.    ASPIRIN 81 MG CAP    Take 81 mg by mouth.    KRILL OIL ORAL    Take 1 capsule by mouth once daily.    LOSARTAN (COZAAR) 25 MG TABLET    Take 25 mg by mouth once daily.    METOPROLOL SUCCINATE (TOPROL-XL) 25 MG 24 HR TABLET    Take 1 tablet (25 mg total) by mouth once daily.    METOPROLOL TARTRATE (LOPRESSOR) 50 MG TABLET    Take 50 mg by mouth 2 (two) times daily.    MULTIVITAMIN CAPSULE    Take 1 capsule by mouth once daily.    POTASSIUM CHLORIDE SA (K-DUR,KLOR-CON M) 10 MEQ TABLET    Take 2 tablets (20 mEq total) by mouth 2 (two) times daily.    RED YEAST RICE ORAL    Take 1 tablet by mouth once daily.    TURMERIC, BULK, MISC    Take 1 tablet by mouth once daily.    UBIDECARENONE (CO Q-10 ORAL)    Take 1 tablet by mouth once daily.     Family History    Problem Relation Age of Onset    Coronary artery disease Mother     Coronary artery disease Father      Social History     Socioeconomic History    Marital status:    Tobacco Use    Smoking status: Never    Smokeless tobacco: Never   Substance and Sexual Activity    Alcohol use: Not Currently    Drug use: Never         Review of Systems   Constitutional:  Negative for appetite change, chills, fever and unexpected weight change.   HENT:  Negative for hearing loss, rhinorrhea, sore throat and voice change.    Eyes:  Negative for photophobia and visual disturbance.   Respiratory:  Negative for cough, choking and shortness of breath.    Cardiovascular:  Negative for chest pain, palpitations and leg swelling.   Gastrointestinal:  Negative for abdominal pain, blood in stool, constipation, diarrhea, nausea and vomiting.   Endocrine: Negative for cold intolerance, heat intolerance, polydipsia and polyuria.   Musculoskeletal:  Negative for arthralgias, back pain, joint swelling and neck stiffness.   Skin:  Negative for color change, pallor and rash.   Neurological:  Negative for dizziness, seizures, syncope and headaches.   Hematological:  Negative for adenopathy. Does not bruise/bleed easily.   Psychiatric/Behavioral:  Negative for agitation, behavioral problems and confusion.      Objective:      Physical Exam  Constitutional:       General: He is not in acute distress.     Appearance: Normal appearance. He is well-developed. He is not toxic-appearing.   HENT:      Head: Normocephalic and atraumatic. No abrasion or laceration.      Right Ear: External ear normal.      Left Ear: External ear normal.      Nose: Nose normal.   Eyes:      Pupils: Pupils are equal, round, and reactive to light.   Neck:      Trachea: Trachea and phonation normal. No tracheal deviation.   Cardiovascular:      Rate and Rhythm: Normal rate and regular rhythm.   Pulmonary:      Effort: Pulmonary effort is normal. No tachypnea, accessory  muscle usage or respiratory distress.   Abdominal:      General: There is no distension.      Palpations: Abdomen is soft. There is no mass.      Tenderness: There is no abdominal tenderness. There is no right CVA tenderness, left CVA tenderness, guarding or rebound.   Musculoskeletal:      Cervical back: Neck supple. Normal range of motion.   Skin:     General: Skin is warm.      Coloration: Skin is not jaundiced.   Neurological:      Mental Status: He is alert and oriented to person, place, and time.      Coordination: Coordination normal.      Gait: Gait normal.   Psychiatric:         Speech: Speech normal.         Behavior: Behavior normal.       Assessment/Plan:   Diverticulitis of sigmoid colon  -     Ambulatory referral/consult to Gastroenterology; Future; Expected date: 02/23/2023      Patient doing well since discharge.  Physical exam benign.  He is asymptomatic.  Will refer to GI for colonoscopy.  Follow up with me after colonoscopy.

## 2023-04-21 ENCOUNTER — TELEPHONE (OUTPATIENT)
Dept: UROLOGY | Facility: CLINIC | Age: 53
End: 2023-04-21
Payer: COMMERCIAL

## 2023-04-21 NOTE — TELEPHONE ENCOUNTER
Spoke with patient he states he will like to start his testosterone injections back since he has recovered from colon problem.

## 2023-04-21 NOTE — TELEPHONE ENCOUNTER
----- Message from Taniya Grajeda sent at 4/21/2023 11:03 AM CDT -----  Name of Who is Calling:JACI ARROYO [[id number]]       What is the request in detail: pt stated that he would like to speak regarding neck injections        Can the clinic reply by [facility]: no        What Number to [facility] Back [facility][phone]

## 2023-04-21 NOTE — TELEPHONE ENCOUNTER
Ok he can restart    Plan at visit in mindy:  And then continue 100 mg testosterone q.2 weeks.  Doing shots here.  CBC, PSA and testosterone in 6 months and follow-up after, donated blood a few weeks ago and donating again on February 25th.  H&H was increasing.  We will see what his CBC shows since he just gave blood a few weeks ago.    Recent Labs   Lab 01/24/23 1938 01/25/23  0538 01/26/23  0510   WBC 12.83 H 9.48 7.31   Hemoglobin 13.9 L 13.6 L 13.4 L   Hematocrit 40.7 40.8 40.3   Platelets 218 198 213   ]  Recent Labs   Lab 08/16/22  0848 01/24/23  1622 01/24/23 1937 01/25/23  0538 01/26/23  0510   Sodium 140 135 L 136 137 138   Potassium 4.6 3.8 3.4 L 3.7 4.1   Chloride 103 100 98 102 105   CO2 26 22 L 24 24 27   BUN 14 14 17 15 13   Creatinine 1.2 1.2 1.0 1.0 1.0   Glucose 106 103 93 100 105   Calcium 9.9 9.6 9.5 8.9 8.7   Magnesium  --   --   --  2.0 2.4   Alkaline Phosphatase 50 L 55 52 L  --   --    Total Protein 7.3 8.0 8.3  --   --    Albumin 4.6 4.1 4.3  --   --    Total Bilirubin 0.6 1.0 1.2 H  --   --    AST 22 13 14  --   --    ALT 32 19 19  --   --    ]    Lab Results   Component Value Date    HGBA1C 5.9 01/25/2023

## 2023-05-05 ENCOUNTER — CLINICAL SUPPORT (OUTPATIENT)
Dept: UROLOGY | Facility: CLINIC | Age: 53
End: 2023-05-05
Payer: COMMERCIAL

## 2023-05-05 DIAGNOSIS — E29.1 HYPOGONADISM IN MALE: Primary | ICD-10-CM

## 2023-05-05 PROCEDURE — 99999 PR PBB SHADOW E&M-EST. PATIENT-LVL I: ICD-10-PCS | Mod: PBBFAC,,,

## 2023-05-05 PROCEDURE — 99499 NO LOS: ICD-10-PCS | Mod: S$GLB,,, | Performed by: UROLOGY

## 2023-05-05 PROCEDURE — 99499 UNLISTED E&M SERVICE: CPT | Mod: S$GLB,,, | Performed by: UROLOGY

## 2023-05-05 PROCEDURE — 96372 PR INJECTION,THERAP/PROPH/DIAG2ST, IM OR SUBCUT: ICD-10-PCS | Mod: S$GLB,,, | Performed by: UROLOGY

## 2023-05-05 PROCEDURE — 96372 THER/PROPH/DIAG INJ SC/IM: CPT | Mod: S$GLB,,, | Performed by: UROLOGY

## 2023-05-05 PROCEDURE — 99999 PR PBB SHADOW E&M-EST. PATIENT-LVL I: CPT | Mod: PBBFAC,,,

## 2023-05-05 RX ORDER — TESTOSTERONE CYPIONATE 200 MG/ML
200 INJECTION, SOLUTION INTRAMUSCULAR ONCE
Status: COMPLETED | OUTPATIENT
Start: 2023-05-05 | End: 2023-05-05

## 2023-05-05 RX ADMIN — TESTOSTERONE CYPIONATE 200 MG: 200 INJECTION, SOLUTION INTRAMUSCULAR at 08:05

## 2023-05-19 ENCOUNTER — CLINICAL SUPPORT (OUTPATIENT)
Dept: UROLOGY | Facility: CLINIC | Age: 53
End: 2023-05-19
Payer: COMMERCIAL

## 2023-05-19 DIAGNOSIS — E29.1 HYPOGONADISM IN MALE: Primary | ICD-10-CM

## 2023-05-19 PROCEDURE — 99999 PR PBB SHADOW E&M-EST. PATIENT-LVL I: CPT | Mod: PBBFAC,,,

## 2023-05-19 PROCEDURE — 99499 NO LOS: ICD-10-PCS | Mod: S$GLB,,, | Performed by: UROLOGY

## 2023-05-19 PROCEDURE — 96372 PR INJECTION,THERAP/PROPH/DIAG2ST, IM OR SUBCUT: ICD-10-PCS | Mod: S$GLB,,, | Performed by: UROLOGY

## 2023-05-19 PROCEDURE — 96372 THER/PROPH/DIAG INJ SC/IM: CPT | Mod: S$GLB,,, | Performed by: UROLOGY

## 2023-05-19 PROCEDURE — 99999 PR PBB SHADOW E&M-EST. PATIENT-LVL I: ICD-10-PCS | Mod: PBBFAC,,,

## 2023-05-19 PROCEDURE — 99499 UNLISTED E&M SERVICE: CPT | Mod: S$GLB,,, | Performed by: UROLOGY

## 2023-05-19 RX ORDER — TESTOSTERONE CYPIONATE 200 MG/ML
100 INJECTION, SOLUTION INTRAMUSCULAR
Status: SHIPPED | OUTPATIENT
Start: 2023-05-19 | End: 2023-07-28

## 2023-05-19 RX ADMIN — TESTOSTERONE CYPIONATE 100 MG: 200 INJECTION, SOLUTION INTRAMUSCULAR at 08:05

## 2023-05-19 NOTE — PROGRESS NOTES
Patient arrived to clinic for testosterone injection, two patient identifier done. Testosterone 100 mg Given in the left upper gluteal, patient tolerated well.

## 2023-11-02 ENCOUNTER — TELEPHONE (OUTPATIENT)
Dept: UROLOGY | Facility: CLINIC | Age: 53
End: 2023-11-02

## 2023-11-02 NOTE — TELEPHONE ENCOUNTER
----- Message from Isatu Rico sent at 11/2/2023 11:51 AM CDT -----  Regarding: advise  Contact: Patient  Type: Needs Medical Advice    Who Called:  Patient    Symptoms (please be specific):  nurse visit    How long has patient had these symptoms:      Pharmacy name and phone #:      Best Call Back Number: [phone]    Additional Information: Trevor Hartman, I have Nancy Retana MRN: [id number] seeking to come in monday for a testosterones injection are u available

## 2023-11-02 NOTE — TELEPHONE ENCOUNTER
Spoke with patient last visit in January, patient states he has been cleared to restart testosterone. Appointment scheduled with np next to discuss restarting testosterone. Patient verbally voiced understanding.

## 2024-07-09 ENCOUNTER — PATIENT MESSAGE (OUTPATIENT)
Dept: ADMINISTRATIVE | Facility: HOSPITAL | Age: 54
End: 2024-07-09
Payer: COMMERCIAL